# Patient Record
Sex: MALE | Race: WHITE | NOT HISPANIC OR LATINO | Employment: OTHER | URBAN - METROPOLITAN AREA
[De-identification: names, ages, dates, MRNs, and addresses within clinical notes are randomized per-mention and may not be internally consistent; named-entity substitution may affect disease eponyms.]

---

## 2019-07-14 ENCOUNTER — APPOINTMENT (EMERGENCY)
Dept: RADIOLOGY | Facility: HOSPITAL | Age: 69
End: 2019-07-14
Payer: MEDICARE

## 2019-07-14 ENCOUNTER — HOSPITAL ENCOUNTER (EMERGENCY)
Facility: HOSPITAL | Age: 69
End: 2019-07-15
Attending: EMERGENCY MEDICINE | Admitting: EMERGENCY MEDICINE
Payer: MEDICARE

## 2019-07-14 DIAGNOSIS — S22.39XA RIB FRACTURE: ICD-10-CM

## 2019-07-14 DIAGNOSIS — S12.9XXA CLOSED CERVICAL SPINE FRACTURE (HCC): Primary | ICD-10-CM

## 2019-07-14 LAB
ALBUMIN SERPL BCP-MCNC: 3.3 G/DL (ref 3.5–5)
ALP SERPL-CCNC: 77 U/L (ref 46–116)
ALT SERPL W P-5'-P-CCNC: 37 U/L (ref 12–78)
ANION GAP SERPL CALCULATED.3IONS-SCNC: 12 MMOL/L (ref 4–13)
APTT PPP: 31 SECONDS (ref 23–37)
AST SERPL W P-5'-P-CCNC: 37 U/L (ref 5–45)
BASOPHILS # BLD AUTO: 0.03 THOUSANDS/ΜL (ref 0–0.1)
BASOPHILS NFR BLD AUTO: 0 % (ref 0–1)
BILIRUB SERPL-MCNC: 0.9 MG/DL (ref 0.2–1)
BUN SERPL-MCNC: 16 MG/DL (ref 5–25)
CALCIUM SERPL-MCNC: 8.5 MG/DL (ref 8.3–10.1)
CHLORIDE SERPL-SCNC: 103 MMOL/L (ref 100–108)
CO2 SERPL-SCNC: 25 MMOL/L (ref 21–32)
CREAT SERPL-MCNC: 0.63 MG/DL (ref 0.6–1.3)
EOSINOPHIL # BLD AUTO: 0.01 THOUSAND/ΜL (ref 0–0.61)
EOSINOPHIL NFR BLD AUTO: 0 % (ref 0–6)
ERYTHROCYTE [DISTWIDTH] IN BLOOD BY AUTOMATED COUNT: 13.4 % (ref 11.6–15.1)
ETHANOL SERPL-MCNC: <3 MG/DL (ref 0–3)
GFR SERPL CREATININE-BSD FRML MDRD: 102 ML/MIN/1.73SQ M
GLUCOSE SERPL-MCNC: 73 MG/DL (ref 65–140)
HCT VFR BLD AUTO: 44.8 % (ref 36.5–49.3)
HGB BLD-MCNC: 15.2 G/DL (ref 12–17)
IMM GRANULOCYTES # BLD AUTO: 0.05 THOUSAND/UL (ref 0–0.2)
IMM GRANULOCYTES NFR BLD AUTO: 0 % (ref 0–2)
INR PPP: 0.88 (ref 0.86–1.16)
LYMPHOCYTES # BLD AUTO: 0.85 THOUSANDS/ΜL (ref 0.6–4.47)
LYMPHOCYTES NFR BLD AUTO: 7 % (ref 14–44)
MCH RBC QN AUTO: 34.5 PG (ref 26.8–34.3)
MCHC RBC AUTO-ENTMCNC: 33.9 G/DL (ref 31.4–37.4)
MCV RBC AUTO: 102 FL (ref 82–98)
MONOCYTES # BLD AUTO: 0.88 THOUSAND/ΜL (ref 0.17–1.22)
MONOCYTES NFR BLD AUTO: 7 % (ref 4–12)
NEUTROPHILS # BLD AUTO: 10.32 THOUSANDS/ΜL (ref 1.85–7.62)
NEUTS SEG NFR BLD AUTO: 86 % (ref 43–75)
NRBC BLD AUTO-RTO: 0 /100 WBCS
PLATELET # BLD AUTO: 272 THOUSANDS/UL (ref 149–390)
PMV BLD AUTO: 10 FL (ref 8.9–12.7)
POTASSIUM SERPL-SCNC: 3.8 MMOL/L (ref 3.5–5.3)
PROT SERPL-MCNC: 6.7 G/DL (ref 6.4–8.2)
PROTHROMBIN TIME: 9.3 SECONDS (ref 9.4–11.7)
RBC # BLD AUTO: 4.4 MILLION/UL (ref 3.88–5.62)
SODIUM SERPL-SCNC: 140 MMOL/L (ref 136–145)
WBC # BLD AUTO: 12.14 THOUSAND/UL (ref 4.31–10.16)

## 2019-07-14 PROCEDURE — 85025 COMPLETE CBC W/AUTO DIFF WBC: CPT | Performed by: EMERGENCY MEDICINE

## 2019-07-14 PROCEDURE — 71260 CT THORAX DX C+: CPT

## 2019-07-14 PROCEDURE — 85610 PROTHROMBIN TIME: CPT | Performed by: EMERGENCY MEDICINE

## 2019-07-14 PROCEDURE — 72125 CT NECK SPINE W/O DYE: CPT

## 2019-07-14 PROCEDURE — 80053 COMPREHEN METABOLIC PANEL: CPT | Performed by: EMERGENCY MEDICINE

## 2019-07-14 PROCEDURE — 70450 CT HEAD/BRAIN W/O DYE: CPT

## 2019-07-14 PROCEDURE — 74177 CT ABD & PELVIS W/CONTRAST: CPT

## 2019-07-14 PROCEDURE — 85730 THROMBOPLASTIN TIME PARTIAL: CPT | Performed by: EMERGENCY MEDICINE

## 2019-07-14 PROCEDURE — 80320 DRUG SCREEN QUANTALCOHOLS: CPT | Performed by: EMERGENCY MEDICINE

## 2019-07-14 PROCEDURE — 99285 EMERGENCY DEPT VISIT HI MDM: CPT

## 2019-07-14 PROCEDURE — 36415 COLL VENOUS BLD VENIPUNCTURE: CPT | Performed by: EMERGENCY MEDICINE

## 2019-07-14 RX ADMIN — IOHEXOL 100 ML: 350 INJECTION, SOLUTION INTRAVENOUS at 22:54

## 2019-07-15 ENCOUNTER — APPOINTMENT (INPATIENT)
Dept: RADIOLOGY | Facility: HOSPITAL | Age: 69
DRG: 552 | End: 2019-07-15
Payer: MEDICARE

## 2019-07-15 ENCOUNTER — HOSPITAL ENCOUNTER (INPATIENT)
Facility: HOSPITAL | Age: 69
LOS: 1 days | Discharge: HOME HEALTH | DRG: 552 | End: 2019-07-16
Attending: SURGERY | Admitting: SURGERY
Payer: MEDICARE

## 2019-07-15 VITALS
OXYGEN SATURATION: 96 % | TEMPERATURE: 98.1 F | DIASTOLIC BLOOD PRESSURE: 85 MMHG | HEART RATE: 92 BPM | RESPIRATION RATE: 18 BRPM | WEIGHT: 160 LBS | HEIGHT: 71 IN | BODY MASS INDEX: 22.4 KG/M2 | SYSTOLIC BLOOD PRESSURE: 165 MMHG

## 2019-07-15 DIAGNOSIS — S12.9XXA CLOSED FRACTURE OF SPINOUS PROCESS OF CERVICAL VERTEBRA, INITIAL ENCOUNTER (HCC): Primary | ICD-10-CM

## 2019-07-15 DIAGNOSIS — S22.32XA CLOSED FRACTURE OF ONE RIB OF LEFT SIDE, INITIAL ENCOUNTER: ICD-10-CM
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
## 2019-07-15 LAB
ANION GAP SERPL CALCULATED.3IONS-SCNC: 10 MMOL/L (ref 4–13)
BASOPHILS # BLD AUTO: 0.03 THOUSANDS/ΜL (ref 0–0.1)
BASOPHILS NFR BLD AUTO: 0 % (ref 0–1)
BUN SERPL-MCNC: 15 MG/DL (ref 5–25)
CALCIUM SERPL-MCNC: 8.6 MG/DL (ref 8.3–10.1)
CHLORIDE SERPL-SCNC: 104 MMOL/L (ref 100–108)
CO2 SERPL-SCNC: 23 MMOL/L (ref 21–32)
CREAT SERPL-MCNC: 0.44 MG/DL (ref 0.6–1.3)
EOSINOPHIL # BLD AUTO: 0.04 THOUSAND/ΜL (ref 0–0.61)
EOSINOPHIL NFR BLD AUTO: 0 % (ref 0–6)
ERYTHROCYTE [DISTWIDTH] IN BLOOD BY AUTOMATED COUNT: 13.5 % (ref 11.6–15.1)
GFR SERPL CREATININE-BSD FRML MDRD: 118 ML/MIN/1.73SQ M
GLUCOSE SERPL-MCNC: 71 MG/DL (ref 65–140)
HCT VFR BLD AUTO: 43.9 % (ref 36.5–49.3)
HGB BLD-MCNC: 15.2 G/DL (ref 12–17)
IMM GRANULOCYTES # BLD AUTO: 0.04 THOUSAND/UL (ref 0–0.2)
IMM GRANULOCYTES NFR BLD AUTO: 0 % (ref 0–2)
LYMPHOCYTES # BLD AUTO: 0.93 THOUSANDS/ΜL (ref 0.6–4.47)
LYMPHOCYTES NFR BLD AUTO: 8 % (ref 14–44)
MCH RBC QN AUTO: 34.9 PG (ref 26.8–34.3)
MCHC RBC AUTO-ENTMCNC: 34.6 G/DL (ref 31.4–37.4)
MCV RBC AUTO: 101 FL (ref 82–98)
MONOCYTES # BLD AUTO: 0.97 THOUSAND/ΜL (ref 0.17–1.22)
MONOCYTES NFR BLD AUTO: 9 % (ref 4–12)
NEUTROPHILS # BLD AUTO: 9.05 THOUSANDS/ΜL (ref 1.85–7.62)
NEUTS SEG NFR BLD AUTO: 83 % (ref 43–75)
NRBC BLD AUTO-RTO: 0 /100 WBCS
PLATELET # BLD AUTO: 258 THOUSANDS/UL (ref 149–390)
PMV BLD AUTO: 10 FL (ref 8.9–12.7)
POTASSIUM SERPL-SCNC: 3.8 MMOL/L (ref 3.5–5.3)
RBC # BLD AUTO: 4.35 MILLION/UL (ref 3.88–5.62)
SODIUM SERPL-SCNC: 137 MMOL/L (ref 136–145)
WBC # BLD AUTO: 11.06 THOUSAND/UL (ref 4.31–10.16)

## 2019-07-15 PROCEDURE — 36415 COLL VENOUS BLD VENIPUNCTURE: CPT | Performed by: SURGERY

## 2019-07-15 PROCEDURE — 85025 COMPLETE CBC W/AUTO DIFF WBC: CPT | Performed by: SURGERY

## 2019-07-15 PROCEDURE — NC001 PR NO CHARGE: Performed by: PHYSICIAN ASSISTANT

## 2019-07-15 PROCEDURE — 99222 1ST HOSP IP/OBS MODERATE 55: CPT | Performed by: NEUROLOGICAL SURGERY

## 2019-07-15 PROCEDURE — 94760 N-INVAS EAR/PLS OXIMETRY 1: CPT

## 2019-07-15 PROCEDURE — 99285 EMERGENCY DEPT VISIT HI MDM: CPT

## 2019-07-15 PROCEDURE — 72050 X-RAY EXAM NECK SPINE 4/5VWS: CPT

## 2019-07-15 PROCEDURE — 1124F ACP DISCUSS-NO DSCNMKR DOCD: CPT | Performed by: NEUROLOGICAL SURGERY

## 2019-07-15 PROCEDURE — 80048 BASIC METABOLIC PNL TOTAL CA: CPT | Performed by: SURGERY

## 2019-07-15 PROCEDURE — 99222 1ST HOSP IP/OBS MODERATE 55: CPT | Performed by: SURGERY

## 2019-07-15 RX ORDER — THIAMINE MONONITRATE (VIT B1) 100 MG
100 TABLET ORAL DAILY
Status: DISCONTINUED | OUTPATIENT
Start: 2019-07-15 | End: 2019-07-16 | Stop reason: HOSPADM

## 2019-07-15 RX ORDER — LIDOCAINE 50 MG/G
1 PATCH TOPICAL DAILY
Status: DISCONTINUED | OUTPATIENT
Start: 2019-07-15 | End: 2019-07-16 | Stop reason: HOSPADM

## 2019-07-15 RX ORDER — METHOCARBAMOL 500 MG/1
500 TABLET, FILM COATED ORAL EVERY 6 HOURS SCHEDULED
Status: DISCONTINUED | OUTPATIENT
Start: 2019-07-15 | End: 2019-07-16 | Stop reason: HOSPADM

## 2019-07-15 RX ORDER — POLYETHYLENE GLYCOL 3350 17 G/17G
17 POWDER, FOR SOLUTION ORAL DAILY
Status: DISCONTINUED | OUTPATIENT
Start: 2019-07-15 | End: 2019-07-16 | Stop reason: HOSPADM

## 2019-07-15 RX ORDER — DOCUSATE SODIUM 100 MG/1
100 CAPSULE, LIQUID FILLED ORAL 2 TIMES DAILY
Status: DISCONTINUED | OUTPATIENT
Start: 2019-07-15 | End: 2019-07-16 | Stop reason: HOSPADM

## 2019-07-15 RX ORDER — OXYCODONE HYDROCHLORIDE 5 MG/1
5 TABLET ORAL EVERY 4 HOURS PRN
Status: DISCONTINUED | OUTPATIENT
Start: 2019-07-15 | End: 2019-07-16 | Stop reason: HOSPADM

## 2019-07-15 RX ORDER — OXYCODONE HYDROCHLORIDE 10 MG/1
10 TABLET ORAL EVERY 4 HOURS PRN
Status: DISCONTINUED | OUTPATIENT
Start: 2019-07-15 | End: 2019-07-16 | Stop reason: HOSPADM

## 2019-07-15 RX ORDER — FOLIC ACID 1 MG/1
1 TABLET ORAL DAILY
Status: DISCONTINUED | OUTPATIENT
Start: 2019-07-15 | End: 2019-07-16 | Stop reason: HOSPADM

## 2019-07-15 RX ORDER — NICOTINE 21 MG/24HR
1 PATCH, TRANSDERMAL 24 HOURS TRANSDERMAL DAILY
Status: DISCONTINUED | OUTPATIENT
Start: 2019-07-15 | End: 2019-07-16 | Stop reason: HOSPADM

## 2019-07-15 RX ORDER — ACETAMINOPHEN 325 MG/1
975 TABLET ORAL EVERY 8 HOURS SCHEDULED
Status: DISCONTINUED | OUTPATIENT
Start: 2019-07-15 | End: 2019-07-16 | Stop reason: HOSPADM

## 2019-07-15 RX ORDER — HYDROMORPHONE HCL/PF 1 MG/ML
0.5 SYRINGE (ML) INJECTION
Status: DISCONTINUED | OUTPATIENT
Start: 2019-07-15 | End: 2019-07-16 | Stop reason: HOSPADM

## 2019-07-15 RX ORDER — BUTALBITAL, ACETAMINOPHEN AND CAFFEINE 50; 325; 40 MG/1; MG/1; MG/1
1 TABLET ORAL ONCE
Status: DISCONTINUED | OUTPATIENT
Start: 2019-07-15 | End: 2019-07-16 | Stop reason: HOSPADM

## 2019-07-15 RX ORDER — GABAPENTIN 100 MG/1
100 CAPSULE ORAL 3 TIMES DAILY
Status: DISCONTINUED | OUTPATIENT
Start: 2019-07-15 | End: 2019-07-16 | Stop reason: HOSPADM

## 2019-07-15 RX ADMIN — GABAPENTIN 100 MG: 100 CAPSULE ORAL at 18:41

## 2019-07-15 RX ADMIN — ACETAMINOPHEN 975 MG: 325 TABLET ORAL at 21:24

## 2019-07-15 RX ADMIN — DOCUSATE SODIUM 100 MG: 100 CAPSULE, LIQUID FILLED ORAL at 08:37

## 2019-07-15 RX ADMIN — ENOXAPARIN SODIUM 30 MG: 30 INJECTION SUBCUTANEOUS at 18:42

## 2019-07-15 RX ADMIN — GABAPENTIN 100 MG: 100 CAPSULE ORAL at 21:24

## 2019-07-15 RX ADMIN — METHOCARBAMOL 500 MG: 500 TABLET, FILM COATED ORAL at 06:11

## 2019-07-15 RX ADMIN — GABAPENTIN 100 MG: 100 CAPSULE ORAL at 08:37

## 2019-07-15 RX ADMIN — ACETAMINOPHEN 975 MG: 325 TABLET ORAL at 06:11

## 2019-07-15 RX ADMIN — LIDOCAINE 1 PATCH: 50 PATCH CUTANEOUS at 08:38

## 2019-07-15 RX ADMIN — OXYCODONE HYDROCHLORIDE 10 MG: 10 TABLET ORAL at 08:37

## 2019-07-15 RX ADMIN — Medication 1 TABLET: at 08:37

## 2019-07-15 RX ADMIN — ACETAMINOPHEN 975 MG: 325 TABLET ORAL at 13:40

## 2019-07-15 RX ADMIN — OXYCODONE HYDROCHLORIDE 10 MG: 10 TABLET ORAL at 03:39

## 2019-07-15 RX ADMIN — OXYCODONE HYDROCHLORIDE 10 MG: 10 TABLET ORAL at 19:24

## 2019-07-15 RX ADMIN — THIAMINE HCL TAB 100 MG 100 MG: 100 TAB at 08:52

## 2019-07-15 RX ADMIN — FOLIC ACID 1 MG: 1 TABLET ORAL at 08:37

## 2019-07-15 RX ADMIN — METHOCARBAMOL 500 MG: 500 TABLET, FILM COATED ORAL at 11:01

## 2019-07-15 RX ADMIN — DOCUSATE SODIUM 100 MG: 100 CAPSULE, LIQUID FILLED ORAL at 18:41

## 2019-07-15 RX ADMIN — METHOCARBAMOL 500 MG: 500 TABLET, FILM COATED ORAL at 18:41

## 2019-07-15 RX ADMIN — HYDROMORPHONE HYDROCHLORIDE 0.5 MG: 1 INJECTION, SOLUTION INTRAMUSCULAR; INTRAVENOUS; SUBCUTANEOUS at 21:24

## 2019-07-15 NOTE — EMTALA/ACUTE CARE TRANSFER
[address] [address]  Dept: [phone]      EMTALA TRANSFER CONSENT    NAME Hughie Canavan                                         1950                              MRN [id number]    I have been informed of my rights regarding examination, treatment, and transfer   by Dr Rashmi Parikh DO    Benefits: Specialized equipment and/or services available at the receiving facility (Include comment)________________________(Trauma eval)    Risks: Potential for delay in receiving treatment, Potential deterioration of medical condition, Increased discomfort during transfer, Possible worsening of condition or death during transfer      Consent for Transfer:  I acknowledge that my medical condition has been evaluated and explained to me by the emergency department physician or other qualified medical person and/or my attending physician, who has recommended that I be transferred to the service of  Accepting Physician: Dr Kalyani Ruffin at [address] [place] [address] Name, [address] : [address] [facility], [place]  The above potential benefits of such transfer, the potential risks associated with such transfer, and the probable risks of not being transferred have been explained to me, and I fully understand them  The doctor has explained that, in my case, the benefits of transfer outweigh the risks  I agree to be transferred  I authorize the performance of emergency medical procedures and treatments upon me in both transit and upon arrival at the receiving facility  Additionally, I authorize the release of any and all medical records to the receiving facility and request they be transported with me, if possible  I understand that the safest mode of transportation during a medical emergency is an ambulance and that the Hospital advocates the use of this mode of transport   Risks of traveling to the receiving facility by car, including absence of medical control, life sustaining equipment, such as oxygen, and medical personnel has been explained to me and I fully understand them  (YARI CORRECT BOX BELOW)  [  ]  I consent to the stated transfer and to be transported by ambulance/helicopter  [  ]  I consent to the stated transfer, but refuse transportation by ambulance and accept full responsibility for my transportation by car  I understand the risks of non-ambulance transfers and I exonerate the Hospital and its staff from any deterioration in my condition that results from this refusal     X___________________________________________    DATE  19  TIME________  Signature of patient or legally responsible individual signing on patient behalf           RELATIONSHIP TO PATIENT_________________________          Provider Certification    NAME Krystyna Irene                                         1950                              MRN [id number]    A medical screening exam was performed on the above named patient  Based on the examination:    Condition Necessitating Transfer The primary encounter diagnosis was Closed cervical spine fracture ([facility] [address] )  A diagnosis of Rib fracture was also pertinent to this visit  Patient Condition:  An emergency transfer is being made prior to stabilization due to the need for definitive care and the benefit of transfer outweighs the risk    Reason for Transfer: Level of Care needed not available at this facility    Transfer Requirements: [address] [place], [place]   · Space available and qualified personnel available for treatment as acknowledged by    · Agreed to accept transfer and to provide appropriate medical treatment as acknowledged by       Dr Jewelene Gosselin  · Appropriate medical records of the examination and treatment of the patient are provided at the time of transfer   [address] [facility] _______  · Transfer will be performed by qualified personnel from    and appropriate transfer equipment as required, including the use of necessary and appropriate life support measures  Provider Certification: I have examined the patient and explained the following risks and benefits of being transferred/refusing transfer to the patient/family:  General risk, such as traffic hazards, adverse weather conditions, rough terrain or turbulence, possible failure of equipment (including vehicle or aircraft), or consequences of actions of persons outside the control of the transport personnel, Risk of worsening condition      Based on these reasonable risks and benefits to the patient and/or the unborn child(huber), and based upon the information available at the time of the patients examination, I certify that the medical benefits reasonably to be expected from the provision of appropriate medical treatments at another medical facility outweigh the increasing risks, if any, to the individuals medical condition, and in the case of labor to the unborn child, from effecting the transfer      X____________________________________________ DATE 07/14/19        TIME_______      ORIGINAL - SEND TO MEDICAL RECORDS   COPY - SEND WITH PATIENT DURING TRANSFER
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
## 2019-07-15 NOTE — ASSESSMENT & PLAN NOTE
· 7/14/19 - CT cervical wo: 1) Mildly displaced fracture of the C6 spinous process  · Aspen collar in place during examination  · No midline tenderness  · Ordered flexion/extension films  Collar should be maintained until x-rays completed  · DVT ppx: SCDs  · No anticipated neurosurgical intervention  Pending x-rays

## 2019-07-15 NOTE — SOCIAL WORK
CM met with the patient at bedside to review the CM role and discuss possible dc needs  At time of interview pt is AAOx4 lives in a 3rd floor apartment with approximately 27 steps to apartment and  [address] in [place]  Pt lives with son, and daughter in law  Pt was IPTA ADL's does not drive but takes the bus to appointments and is retired  Pt states he is a   Pt has no DME and ambulates independantly  Pt denies any history of drug abuse but states he drinks a 6 pack of beer a day and states he does abuse alcohol but declines HOST at this time  Pt states he was hospitalized to [facility] in [place] in the 70's for depression and a suicide attempt  Pt denies any history of  VNA but was at [facility] in the past for SNF/ Rehab about 2 years ago  Pt has POA/LW  CM made 2nd request for a copy  Preferred Pharmacy: [facility] on [address]  Contact: Corinna Garcia (sister and [place]) [phone]  PCP: Dr Trivedi    CM reviewed d/c planning process including the following: identifying help at home, patient preference for d/c planning needs, Discharge Lounge, [facility] Meds to Bed program, availability of treatment team to discuss questions or concerns patient and/or family may have regarding understanding medications and recognizing signs and symptoms once discharged  CM also encouraged patient to follow up with all recommended appointments after discharge  Patient advised of importance for patient and family to participate in managing patients medical well being

## 2019-07-15 NOTE — CONSULTS
Acute Pain Service Brief Note    Krystyna Irene MRN: [id number]  Unit/Bed#: [facility] 627-01 Encounter: [id number]      Chart reviewed  This patient does not meet rib fracture protocol  Epidural not indicated  Vitals signs reviewed and stable  If there are any questions please call Acute Pain Service at     Thank you for the consult    Sign off    Vitals:    07/15/19 0730   BP: 152/82   Pulse: 76   Resp: 16   Temp:    SpO2: 94%

## 2019-07-15 NOTE — PLAN OF CARE
Problem: Potential for Falls  Goal: Patient will remain free of falls  Description  INTERVENTIONS:  - Assess patient frequently for physical needs  -  Identify cognitive and physical deficits and behaviors that affect risk of falls    -  [place] fall precautions as indicated by assessment   - Educate patient/family on patient safety including physical limitations  - Instruct patient to call for assistance with activity based on assessment  - Modify environment to reduce risk of injury  - Consider OT/PT consult to assist with strengthening/mobility  Outcome: Progressing     Problem: PAIN - ADULT  Goal: Verbalizes/displays adequate comfort level or baseline comfort level  Description  Interventions:  - Encourage patient to monitor pain and request assistance  - Assess pain using appropriate pain scale  - Administer analgesics based on type and severity of pain and evaluate response  - Implement non-pharmacological measures as appropriate and evaluate response  - Consider cultural and social influences on pain and pain management  - Notify physician/advanced practitioner if interventions unsuccessful or patient reports new pain  Outcome: Progressing     Problem: INFECTION - ADULT  Goal: Absence or prevention of progression during hospitalization  Description  INTERVENTIONS:  - Assess and monitor for signs and symptoms of infection  - Monitor lab/diagnostic results  - Monitor all insertion sites, i e  indwelling lines, tubes, and drains  - Monitor endotracheal (as able) and nasal secretions for changes in amount and color  - [place] appropriate cooling/warming therapies per order  - Administer medications as ordered  - Instruct and encourage patient and family to use good hand hygiene technique  - Identify and instruct in appropriate isolation precautions for identified infection/condition  Outcome: Progressing     Problem: SAFETY ADULT  Goal: Maintain or return to baseline ADL function  Description  INTERVENTIONS:  -  Assess patient's ability to carry out ADLs; assess patient's baseline for ADL function and identify physical deficits which impact ability to perform ADLs (bathing, care of mouth/teeth, toileting, grooming, dressing, etc )  - Assess/evaluate cause of self-care deficits   - Assess range of motion  - Assess patient's mobility; develop plan if impaired  - Assess patient's need for assistive devices and provide as appropriate  - Encourage maximum independence but intervene and supervise when necessary  ¯ Involve family in performance of ADLs  ¯ Assess for home care needs following discharge   ¯ Request OT consult to assist with ADL evaluation and planning for discharge  ¯ Provide patient education as appropriate  Outcome: Progressing  Goal: Maintain or return mobility status to optimal level  Description  INTERVENTIONS:  - Assess patient's baseline mobility status (ambulation, transfers, stairs, etc )    - Identify cognitive and physical deficits and behaviors that affect mobility  - Identify mobility aids required to assist with transfers and/or ambulation (gait belt, sit-to-stand, lift, walker, cane, etc )  - [place] fall precautions as indicated by assessment  - Record patient progress and toleration of activity level on Mobility SBAR; progress patient to next Phase/Stage  - Instruct patient to call for assistance with activity based on assessment  - Request Rehabilitation consult to assist with strengthening/weightbearing, etc   Outcome: Progressing     Problem: DISCHARGE PLANNING  Goal: Discharge to home or other facility with appropriate resources  Description  INTERVENTIONS:  - Identify barriers to discharge w/patient and caregiver  - Arrange for needed discharge resources and transportation as appropriate  - Identify discharge learning needs (meds, wound care, etc )  - Arrange for interpretive services to assist at discharge as needed  - Refer to Case Management Department for coordinating discharge planning if the patient needs post-hospital services based on physician/advanced practitioner order or complex needs related to functional status, cognitive ability, or social support system  Outcome: Progressing     Problem: Knowledge Deficit  Goal: Patient/family/caregiver demonstrates understanding of disease process, treatment plan, medications, and discharge instructions  Description  Complete learning assessment and assess knowledge base    Interventions:  - Provide teaching at level of understanding  - Provide teaching via preferred learning methods  Outcome: Progressing

## 2019-07-15 NOTE — ASSESSMENT & PLAN NOTE
-Aspen collar in place and patient is obtaining flexion-extension x-rays as ordered by Neurosurgery  -cervical spine precautions and cervical collar are to remain in place until the x-rays have resulted  -this is likely chronic as patient has no pain or discomfort in the neck

## 2019-07-15 NOTE — ASSESSMENT & PLAN NOTE
-patient is having pain in the left chest wall associated with this rib fracture  -rib fracture protocol  -pulmonary toilet/incentive spirometry-patient has been compliant with this  -pain is currently adequately controlled and he is requiring no supplemental oxygen  -PT/OT evaluation pending

## 2019-07-15 NOTE — PROGRESS NOTES
Progress Note - Cristina Gomez 1950, 80 y o  male MRN: [id number]    Unit/Bed#: [facility] 627-01 Encounter: [id number]    Primary Care Provider: No primary care provider on file  Date and time admitted to hospital: 7/15/2019  2:08 AM        Fall  Assessment & Plan  -PT/OT evaluations are pending    Closed fracture of spinous process of cervical vertebra [facility])  Assessment & Plan  -Aspen collar in place and patient is obtaining flexion-extension x-rays as ordered by Neurosurgery  -cervical spine precautions and cervical collar are to remain in place until the x-rays have resulted  -this is likely chronic as patient has no pain or discomfort in the neck    * Fracture of one rib of left side  Assessment & Plan  -patient is having pain in the left chest wall associated with this rib fracture  -rib fracture protocol  -pulmonary toilet/incentive spirometry-patient has been compliant with this  -pain is currently adequately controlled and he is requiring no supplemental oxygen  -PT/OT evaluation pending            Bedside nurse rounds completed with nurse  Prophylaxis: Sequential compression device (Venodyne)  and Enoxaparin (Lovenox)    Disposition: PT/OT pending, continue med-surg    Code status:  Level 1 - Full Code    Consultants: Neurosurgery    Is the patient 65 years or older?: YES:    1  Before the illness or injury that brought you to the Emergency, did you need someone to help you on a regular basis? 0=No   2  Since the illness or injury that brought you to the Emergency, have you needed more help than usual to take care of yourself? 0=No   3  Have you been hospitalized for one or more nights during the past 6 months (excluding a stay in the Emergency Department)? 0=No   4  In general, do you see well? 0=Yes   5  In general, do you have serious problems with your memory? 0=No   6  Do you take more than three different medications everyday?  0=No   TOTAL   1     Did you order a geriatric consult if the score was 2 or greater?: no    SUBJECTIVE:     Transfer from: N/A  Outside Films Received: not applicable  Tertiary Exam Due on: 7/15    Mechanism of Injury:Fall    Details related to Injury: +LOC:  no    Chief Complaint:  Left chest wall pain    HPI/Last 24 hour events:  Patient complains of pain in the area of his rib fracture  He denies shortness of breath lightheadedness or dizziness  He denies any pain in his neck wonders if he can have his collar removed  He does admit to drinking 2 pt of alcohol a day  He states he has sometimes had some shakes when he gets long cash in is unable to afford his alcohol  He is a smoker as well but is able to go multiple days without smoking and does not request a nicotine patch      Active medications:           Current Facility-Administered Medications:     acetaminophen (TYLENOL) tablet 975 mg, 975 mg, Oral, Q8H MATTHEW, 975 mg at 07/15/19 1340    butalbital-acetaminophen-caffeine (FIORICET,ESGIC) -40 mg per tablet 1 tablet, 1 tablet, Oral, Once    docusate sodium (COLACE) capsule 100 mg, 100 mg, Oral, BID, 100 mg at 07/15/19 0837    enoxaparin (LOVENOX) subcutaneous injection 30 mg, 30 mg, Subcutaneous, [address]    folic acid (FOLVITE) tablet 1 mg, 1 mg, Oral, Daily, 1 mg at 07/15/19 0837    gabapentin (NEURONTIN) capsule 100 mg, 100 mg, Oral, TID, 100 mg at 07/15/19 0837    HYDROmorphone (DILAUDID) injection 0 5 mg, 0 5 mg, Intravenous, Q3H PRN    lidocaine (LIDODERM) 5 % patch 1 patch, 1 patch, Topical, Daily, 1 patch at 07/15/19 0838    methocarbamol (ROBAXIN) tablet 500 mg, 500 mg, Oral, Q6H MATTHEW, 500 mg at 07/15/19 1101    multivitamin-minerals (CENTRUM) tablet 1 tablet, 1 tablet, Oral, Daily, 1 tablet at 07/15/19 0837    nicotine (NICODERM CQ) 14 mg/24hr TD 24 hr patch 1 patch, 1 patch, Transdermal, Daily    oxyCODONE (ROXICODONE) immediate release tablet 10 mg, 10 mg, Oral, Q4H PRN, 10 mg at 07/15/19 0856    oxyCODONE (ROXICODONE) IR tablet 5 mg, 5 mg, Oral, Q4H PRN    polyethylene glycol (MIRALAX) packet 17 g, 17 g, Oral, Daily    thiamine (VITAMIN B1) tablet 100 mg, 100 mg, Oral, Daily, 100 mg at 07/15/19 0852      OBJECTIVE:     Vitals:   Vitals:    07/15/19 1300   BP: 140/80   Pulse: (!) 106   Resp:    Temp:    SpO2:        Physical Exam:   GENERAL APPEARANCE:  No acute distress  NEURO:  GCS 15, nonfocal exam  HEENT:  Normocephalic, atraumatic  CV:  Regular rate and rhythm, no murmurs gallops or rubs  LUNGS:  Clear to auscultation bilaterally  GI:  Soft, nontender, nondistended  :  Voiding  MSK:  Moving all extremities equally and with full strength  SKIN:  Pink, warm, dry      I/O:   I/O       07/13 0701 - 07/14 0700 07/14 0701 - 07/15 0700 07/15 0701 - 07/16 0700    P  O    1180    Total Intake   1180    Urine   300    Total Output   300    Net   +880                 Invasive Devices: Invasive Devices     Peripheral Intravenous Line            Peripheral IV 07/14/19 Right Antecubital less than 1 day                  Imaging:   No results found      Labs:   CBC:   Lab Results   Component Value Date    WBC 11 06 (H) 07/15/2019    HGB 15 2 07/15/2019    HCT 43 9 07/15/2019     (H) 07/15/2019     07/15/2019    MCH 34 9 (H) 07/15/2019    MCHC 34 6 07/15/2019    RDW 13 5 07/15/2019    MPV 10 0 07/15/2019    NRBC 0 07/15/2019     CMP:   Lab Results   Component Value Date     07/15/2019    CO2 23 07/15/2019    BUN 15 07/15/2019    CREATININE 0 44 (L) 07/15/2019    CALCIUM 8 6 07/15/2019    AST 37 07/14/2019    ALT 37 07/14/2019    ALKPHOS 77 07/14/2019    EGFR 118 07/15/2019

## 2019-07-15 NOTE — ASSESSMENT & PLAN NOTE
· 7/14/19 CT recon thoracolumbar: nondisplaced fracture of left posterior 11th rib  · Pain control  · Pulmonary toilet/IS  · Management per primary team, Trauma

## 2019-07-15 NOTE — RESPIRATORY THERAPY NOTE
RT Protocol Note  Cristina Gomez 80 y o  male MRN: [id number]  Unit/Bed#: ED 02 Encounter: [id number]    Assessment    Principal Problem:    Fracture of one rib of left side  Active Problems:    Closed fracture of spinous process of cervical vertebra [facility])      Home Pulmonary Medications:  NA       History reviewed  No pertinent past medical history  Social History     Socioeconomic History    Marital status: /Civil Union     Spouse name: None    Number of children: None    Years of education: None    Highest education level: None   Occupational History    None   Social Needs    Financial resource strain: None    Food insecurity:     Worry: None     Inability: None    Transportation needs:     Medical: None     Non-medical: None   Tobacco Use    Smoking status: Current Every Day Smoker     Packs/day: 1 00    Smokeless tobacco: Never Used   Substance and Sexual Activity    Alcohol use: Yes     Comment: daily    Drug use: No    Sexual activity: None   Lifestyle    Physical activity:     Days per week: None     Minutes per session: None    Stress: None   Relationships    Social connections:     Talks on phone: None     Gets together: None     Attends Shinto service: None     Active member of club or organization: None     Attends meetings of clubs or organizations: None     Relationship status: None    Intimate partner violence:     Fear of current or ex partner: None     Emotionally abused: None     Physically abused: None     Forced sexual activity: None   Other Topics Concern    None   Social History Narrative    None       Subjective         Objective    Physical Exam:   Assessment Type: (P) Assess only  General Appearance: (P) Awake  Respiratory Pattern: (P) Normal  Chest Assessment: (P) Chest expansion symmetrical  Bilateral Breath Sounds: (P) Clear    Vitals:  Blood pressure 167/91, pulse 86, temperature 97 8 °F (36 6 °C), temperature source Oral, resp  rate 18, SpO2 (P) 96 %  Imaging and other studies: I have personally reviewed pertinent reports  Plan       Airway Clearance Plan: (P) Incentive Spirometer     Resp Comments: (P) Pt not in any respiratory distress  Breathe sounds are clear  Pt has pain upon taking deep breathes  Instructed pt on splinting for pain control  Instructed pt on incentive spirometer  Pt was able to get 1250 on IS  Will continue to work with pt

## 2019-07-15 NOTE — H&P
H&P Exam - Trauma   Rena Epps 80 y o  male MRN: [id number]  Unit/Bed#: ED 02 Encounter: [id number]    Assessment/Plan   Trauma Alert: Evaluation  Model of Arrival: Transfer [facility]  Trauma Team: Attending Alfonso Heard and Residents Sal White  Consultants: Neurosurgery: On Call  Returned call: No no called    Trauma Active Problems:   C6 spinous process fracture  Posterior [place]arer through fracture    Trauma Plan:   Admit to trauma, Med surge  Rib fracture protocol next 9 aspirin collar  Cervical precautions  Consult neuro surgery  Hold DVT prophylaxis pending neurosurgical evaluation  Repeat chest x-ray later today 715    Chief Complaint:  My ribs hurt    History of Present Illness   HPI:  Rena Epps is a 80 y o  male who presents as a transfer from [address] [place] [address] where he presented after having more pain with ambulation and difficulty breathing  Patient is a known alcoholic and smoker  Patient states he has fallen multiple times in the last few days and has struck head however does not have any loss of consciousness  Patient's main complaint is inability to take deep breaths and cough to clear phlegm  Did not realize he had any neck pain however upon scan at [facility] showed a C6 spinous process fracture     Mechanism:Fall    Review of Systems   Constitutional: Negative  HENT: Negative  Eyes: Negative  Respiratory: Negative  Cardiovascular: Negative  Gastrointestinal: Negative  Endocrine: Negative  Genitourinary: Negative  Musculoskeletal: Negative  Skin: Negative  Allergic/Immunologic: Negative  Neurological: Negative  Hematological: Negative  Psychiatric/Behavioral: Negative  Historical Information   History is unobtainable from the patient due to  Efforts to obtain history included the following sources: obtained from other records    History reviewed  No pertinent past medical history    Past Surgical History:   Procedure Laterality Date    TONSILLECTOMY Social History   Social History     Substance and Sexual Activity   Alcohol Use Yes    Comment: daily     Social History     Substance and Sexual Activity   Drug Use No     Social History     Tobacco Use   Smoking Status Current Every Day Smoker    Packs/day: 1 00   Smokeless Tobacco Never Used       There is no immunization history on file for this patient  Last Tetanus: unknown  Family History: Non-contributory  Unable to obtain/limited by n/a      Meds/Allergies   all current active meds have been reviewed    No Known Allergies      PHYSICAL EXAM    PE limited by: n/a    Objective   Vitals:   First set: Temperature: 97 8 °F (36 6 °C) (07/15/19 0214)  Pulse: 90 (07/15/19 0214)  Respirations: 20 (07/15/19 0214)  Blood Pressure: 168/88 (07/15/19 0214)    Primary Survey:   (A) Airway:  Patent  (B) Breathing:  Breath sounds bilaterally  (C) Circulation: Pulses:   normal and radial  2/4  (D) Disabliity:  GCS Total:  15  (E) Expose:  Completed    Secondary Survey: (Click on Physical Exam tab above)  Physical Exam   Constitutional: He is oriented to person, place, and time  He appears well-developed and well-nourished  HENT:   Head: Normocephalic and atraumatic  Eyes: Pupils are equal, round, and reactive to light  EOM are normal    Neck:   In cervical collar, denies midline tenderness   Cardiovascular: Normal rate and regular rhythm  Pulmonary/Chest: No respiratory distress  He exhibits tenderness (Left posterior rib cage tenderness)  Patient with shallow breathing   Abdominal: Soft  He exhibits no distension  There is no tenderness  Genitourinary:   Genitourinary Comments: Deferred   Musculoskeletal: Normal range of motion  He exhibits no tenderness  Neurological: He is alert and oriented to person, place, and time  Skin: Skin is warm  Psychiatric: He has a normal mood and affect   His behavior is normal        Invasive Devices     Peripheral Intravenous Line            Peripheral IV 07/14/19 Right Antecubital less than 1 day                Lab Results:   Results: I have personally reviewed pertinent reports   , BMP/CMP:   Lab Results   Component Value Date    SODIUM 140 07/14/2019    K 3 8 07/14/2019     07/14/2019    CO2 25 07/14/2019    BUN 16 07/14/2019    CREATININE 0 63 07/14/2019    CALCIUM 8 5 07/14/2019    AST 37 07/14/2019    ALT 37 07/14/2019    ALKPHOS 77 07/14/2019    EGFR 102 07/14/2019    and CBC:   Lab Results   Component Value Date    WBC 12 14 (H) 07/14/2019    HGB 15 2 07/14/2019    HCT 44 8 07/14/2019     (H) 07/14/2019     07/14/2019    MCH 34 5 (H) 07/14/2019    MCHC 33 9 07/14/2019    RDW 13 4 07/14/2019    MPV 10 0 07/14/2019    NRBC 0 07/14/2019     Imaging/EKG Studies: Results: I have personally reviewed pertinent reports    , CT Scan Head: Negative, CT Scan C-Spine: C6 spinous process fracture, CT Chest: Left posterior rib fracture, CT Scan Abdomen/Pelvis: Negative  Other Studies:     Code Status: Level 1 - Full Code  Advance Directive and Living Will:      Power of :    POLST:

## 2019-07-15 NOTE — ED PROVIDER NOTES
History  Chief Complaint   Patient presents with    Fall     patient fell yesterday thinks hit head on bumper of truck, possible for LOC, did not seek medical treatment at the time, does drink 2 pts of rum everyday,      Pt presents with c/o left sided back pain that began after multiple falls yestyerday and today  Pain is now worse with ambulation  Pt is an alcoholic and drinks 2pints of rum daily  Last drink was yesterday  Prior to Admission Medications   Prescriptions Last Dose Informant Patient Reported? Taking?   aspirin 500 MG buffered tablet   Yes Yes   Sig: Take 500 mg by mouth daily      Facility-Administered Medications: None       History reviewed  No pertinent past medical history  Past Surgical History:   Procedure Laterality Date    TONSILLECTOMY         History reviewed  No pertinent family history  I have reviewed and agree with the history as documented  Social History     Tobacco Use    Smoking status: Current Every Day Smoker     Packs/day: 1 00    Smokeless tobacco: Never Used   Substance Use Topics    Alcohol use: Yes     Comment: daily    Drug use: No        Review of Systems   Constitutional: Negative for chills and fever  Respiratory: Negative for cough, shortness of breath and wheezing  Cardiovascular: Negative for chest pain and palpitations  Gastrointestinal: Negative for abdominal pain, constipation, diarrhea, nausea and vomiting  Genitourinary: Negative for dysuria, flank pain, hematuria and urgency  Musculoskeletal: Positive for back pain and gait problem  Skin: Negative for color change and rash  All other systems reviewed and are negative  Physical Exam  Physical Exam   Constitutional: He is oriented to person, place, and time  He appears well-developed and well-nourished  HENT:   Head: Normocephalic and atraumatic  Eyes: Pupils are equal, round, and reactive to light   EOM are normal    Cardiovascular: Normal rate, regular rhythm and normal heart sounds  Pulmonary/Chest: Effort normal and breath sounds normal    Abdominal: Soft  Bowel sounds are normal  He exhibits no distension and no mass  There is no tenderness  There is no rebound and no guarding  Musculoskeletal: He exhibits tenderness  Left sided rib tenderness    Neurological: He is alert and oriented to person, place, and time  Skin: Skin is warm and dry  Psychiatric: He has a normal mood and affect  His behavior is normal  Judgment and thought content normal    Nursing note and vitals reviewed        Vital Signs  ED Triage Vitals [07/14/19 2110]   Temperature Pulse Respirations Blood Pressure SpO2   98 1 °F (36 7 °C) 87 16 145/81 95 %      Temp Source Heart Rate Source Patient Position - Orthostatic VS BP Location FiO2 (%)   Tympanic Monitor Lying Right arm --      Pain Score       1           Vitals:    07/14/19 2145 07/14/19 2200 07/14/19 2330 07/15/19 0053   BP: 160/90   165/85   Pulse: 96 90 90 92   Patient Position - Orthostatic VS:             Visual Acuity  Visual Acuity      Most Recent Value   L Pupil Size (mm)  3   R Pupil Size (mm)  3          ED Medications  Medications   iohexol (OMNIPAQUE) 350 MG/ML injection (MULTI-DOSE) 100 mL (100 mL Intravenous Given 7/14/19 2254)       Diagnostic Studies  Results Reviewed     Procedure Component Value Units Date/Time    Comprehensive metabolic panel [[id number]]  (Abnormal) Collected:  07/14/19 2142    Lab Status:  Final result Specimen:  Blood from Arm, Right Updated:  07/14/19 2205     Sodium 140 mmol/L      Potassium 3 8 mmol/L      Chloride 103 mmol/L      CO2 25 mmol/L      ANION GAP 12 mmol/L      BUN 16 mg/dL      Creatinine 0 63 mg/dL      Glucose 73 mg/dL      Calcium 8 5 mg/dL      AST 37 U/L      ALT 37 U/L      Alkaline Phosphatase 77 U/L      Total Protein 6 7 g/dL      Albumin 3 3 g/dL      Total Bilirubin 0 90 mg/dL      eGFR 102 ml/min/1 73sq m     Narrative:       [place] guidelines for Chronic Kidney Disease (CKD):     Stage 1 with normal or high GFR (GFR > 90 mL/min/1 73 square meters)    Stage 2 Mild CKD (GFR = 60-89 mL/min/1 73 square meters)    Stage 3A Moderate CKD (GFR = 45-59 mL/min/1 73 square meters)    Stage 3B Moderate CKD (GFR = 30-44 mL/min/1 73 square meters)    Stage 4 Severe CKD (GFR = 15-29 mL/min/1 73 square meters)    Stage 5 End Stage CKD (GFR <15 mL/min/1 73 square meters)  Note: GFR calculation is accurate only with a steady state creatinine    Protime-INR [[id number]]  (Abnormal) Collected:  07/14/19 2142    Lab Status:  Final result Specimen:  Blood from Arm, Right Updated:  07/14/19 2203     Protime 9 3 seconds      INR 0 88    APTT [[id number]]  (Normal) Collected:  07/14/19 2142    Lab Status:  Final result Specimen:  Blood from Arm, Right Updated:  07/14/19 2203     PTT 31 seconds     Ethanol [[id number]]  (Normal) Collected:  07/14/19 2142    Lab Status:  Final result Specimen:  Blood from Arm, Right Updated:  07/14/19 2201     Ethanol Lvl <3 mg/dL     CBC and differential [[id number]]  (Abnormal) Collected:  07/14/19 2142    Lab Status:  Final result Specimen:  Blood from Arm, Right Updated:  07/14/19 2148     WBC 12 14 Thousand/uL      RBC 4 40 Million/uL      Hemoglobin 15 2 g/dL      Hematocrit 44 8 %       fL      MCH 34 5 pg      MCHC 33 9 g/dL      RDW 13 4 %      MPV 10 0 fL      Platelets 760 Thousands/uL      nRBC 0 /100 WBCs      Neutrophils Relative 86 %      Immat GRANS % 0 %      Lymphocytes Relative 7 %      Monocytes Relative 7 %      Eosinophils Relative 0 %      Basophils Relative 0 %      Neutrophils Absolute 10 32 Thousands/µL      Immature Grans Absolute 0 05 Thousand/uL      Lymphocytes Absolute 0 85 Thousands/µL      Monocytes Absolute 0 88 Thousand/µL      Eosinophils Absolute 0 01 Thousand/µL      Basophils Absolute 0 03 Thousands/µL                  CT recon only thoracolumbar   Final Result by Chelsea Mayes DO (07/14 7384) Nondisplaced fracture of the left posterior 11th rib  Workstation performed: [id number]         CT chest abdomen pelvis w contrast   Final Result by Elisabeth Balderas DO (07/14 2322)      No acute findings  Small hypodense lesions in the liver for which nonemergent liver ultrasound is recommended for further evaluation  Workstation performed: [id number]         CT cervical spine without contrast   Final Result by Elisabeth Balderas DO (07/14 2331)      Mildly displaced fracture of the C6 spinous process  I personally discussed this study with Pal Smith on 7/14/2019 at 11:14 PM                             Workstation performed: [id number]         CT head without contrast   Final Result by Elisabeth Balderas DO (07/14 2310)      No acute intracranial abnormality  Sinus disease                  Workstation performed: [id number]                    Procedures  Procedures       ED Course                               MDM  Number of Diagnoses or Management Options  Closed cervical spine fracture [facility]):   Rib fracture:   Diagnosis management comments: D/w Dr Dinorah Shearer of [facility] [place] [facility]  Will transfer        Amount and/or Complexity of Data Reviewed  Clinical lab tests: ordered and reviewed  Tests in the radiology section of CPT®: ordered and reviewed    Risk of Complications, Morbidity, and/or Mortality  Presenting problems: moderate  Diagnostic procedures: moderate  Management options: moderate    Patient Progress  Patient progress: stable      Disposition  Final diagnoses:   Closed cervical spine fracture ([address] )   Rib fracture     Time reflects when diagnosis was documented in both MDM as applicable and the Disposition within this note     Time User Action Codes Description Comment    7/14/2019 11:44 PM Jose Chan Add [S12  9XXA] Closed cervical spine fracture ([address] )     7/14/2019 11:44 PM Jose Fitzpatrick [S22 39XA] Rib fracture       ED Disposition     ED Disposition Condition Date/Time Comment    Transfer to Another Facility-In Network  Sun Jul 14, 2019 11:42 PM Singh Perez should be transferred out to           MD Documentation      Most Recent Value   Patient Condition  An emergency transfer is being made prior to stabilization due to the need for definitive care and the benefit of transfer outweighs the risk   Reason for Transfer  Level of Care needed not available at this facility   Benefits of Transfer  Specialized equipment and/or services available at the receiving facility (Include comment)________________________ Patrice aparicio]   Risks of Transfer  Potential for delay in receiving treatment, Potential deterioration of medical condition, Increased discomfort during transfer, Possible worsening of condition or death during transfer   Accepting Physician  Dr Vijay Stoner Name, Michael [place]   Sending MD Dr Martin Salvage   Provider Certification  General risk, such as traffic hazards, adverse weather conditions, rough terrain or turbulence, possible failure of equipment (including vehicle or aircraft), or consequences of actions of persons outside the control of the transport personnel, Risk of worsening condition      RN Documentation      [facility] Name, Paula Wright   Bed Assignment  ER   Report Given to  Savana Dixon RN   Medications Reviewed with Next Provider of Service  Yes   Transport Mode  Ambulance   Level of Care  Basic life support   Copies of Medical Records Sent  Transfer form   Patient Belongings Disposition  Sent with patient   Transfer Date  07/15/19   Transfer Time  0114      Follow-up Information    None         Discharge Medication List as of 7/15/2019  1:59 AM      CONTINUE these medications which have NOT CHANGED    Details   aspirin 500 MG buffered tablet Take 500 mg by mouth daily, Historical Med           No discharge procedures on file     ED Provider  Electronically Signed by           Sonam Bazzi DO  07/15/19 8206

## 2019-07-16 ENCOUNTER — APPOINTMENT (INPATIENT)
Dept: RADIOLOGY | Facility: HOSPITAL | Age: 69
DRG: 552 | End: 2019-07-16
Payer: MEDICARE

## 2019-07-16 VITALS
WEIGHT: 160 LBS | HEART RATE: 98 BPM | HEIGHT: 71 IN | SYSTOLIC BLOOD PRESSURE: 124 MMHG | TEMPERATURE: 98.1 F | OXYGEN SATURATION: 94 % | DIASTOLIC BLOOD PRESSURE: 100 MMHG | BODY MASS INDEX: 22.4 KG/M2 | RESPIRATION RATE: 21 BRPM

## 2019-07-16 PROCEDURE — 97166 OT EVAL MOD COMPLEX 45 MIN: CPT

## 2019-07-16 PROCEDURE — 99238 HOSP IP/OBS DSCHRG MGMT 30/<: CPT | Performed by: SURGERY

## 2019-07-16 PROCEDURE — 97163 PT EVAL HIGH COMPLEX 45 MIN: CPT

## 2019-07-16 PROCEDURE — G8978 MOBILITY CURRENT STATUS: HCPCS

## 2019-07-16 PROCEDURE — G8989 SELF CARE D/C STATUS: HCPCS

## 2019-07-16 PROCEDURE — G8988 SELF CARE GOAL STATUS: HCPCS

## 2019-07-16 PROCEDURE — 71046 X-RAY EXAM CHEST 2 VIEWS: CPT

## 2019-07-16 PROCEDURE — 94760 N-INVAS EAR/PLS OXIMETRY 1: CPT

## 2019-07-16 PROCEDURE — G8987 SELF CARE CURRENT STATUS: HCPCS

## 2019-07-16 PROCEDURE — 99232 SBSQ HOSP IP/OBS MODERATE 35: CPT | Performed by: NEUROLOGICAL SURGERY

## 2019-07-16 PROCEDURE — G8979 MOBILITY GOAL STATUS: HCPCS

## 2019-07-16 RX ORDER — ACETAMINOPHEN 325 MG/1
975 TABLET ORAL EVERY 8 HOURS SCHEDULED
Qty: 30 TABLET | Refills: 0 | Status: SHIPPED | OUTPATIENT
Start: 2019-07-16 | End: 2019-07-30

## 2019-07-16 RX ORDER — POLYETHYLENE GLYCOL 3350 17 G/17G
17 POWDER, FOR SOLUTION ORAL DAILY PRN
Qty: 14 EACH | Refills: 0 | Status: SHIPPED | OUTPATIENT
Start: 2019-07-16 | End: 2019-08-01 | Stop reason: ALTCHOICE

## 2019-07-16 RX ORDER — OXYCODONE HYDROCHLORIDE 5 MG/1
5-10 TABLET ORAL EVERY 4 HOURS PRN
Qty: 36 TABLET | Refills: 0 | Status: SHIPPED | OUTPATIENT
Start: 2019-07-16 | End: 2019-07-26

## 2019-07-16 RX ORDER — METHOCARBAMOL 500 MG/1
500 TABLET, FILM COATED ORAL EVERY 6 HOURS SCHEDULED
Qty: 40 TABLET | Refills: 0 | Status: SHIPPED | OUTPATIENT
Start: 2019-07-16 | End: 2019-07-30

## 2019-07-16 RX ADMIN — ACETAMINOPHEN 975 MG: 325 TABLET ORAL at 13:40

## 2019-07-16 RX ADMIN — OXYCODONE HYDROCHLORIDE 10 MG: 10 TABLET ORAL at 09:40

## 2019-07-16 RX ADMIN — FOLIC ACID 1 MG: 1 TABLET ORAL at 08:58

## 2019-07-16 RX ADMIN — METHOCARBAMOL 500 MG: 500 TABLET, FILM COATED ORAL at 12:15

## 2019-07-16 RX ADMIN — DOCUSATE SODIUM 100 MG: 100 CAPSULE, LIQUID FILLED ORAL at 08:58

## 2019-07-16 RX ADMIN — LIDOCAINE 1 PATCH: 50 PATCH CUTANEOUS at 09:01

## 2019-07-16 RX ADMIN — Medication 1 TABLET: at 08:58

## 2019-07-16 RX ADMIN — METHOCARBAMOL 500 MG: 500 TABLET, FILM COATED ORAL at 00:50

## 2019-07-16 RX ADMIN — METHOCARBAMOL 500 MG: 500 TABLET, FILM COATED ORAL at 05:31

## 2019-07-16 RX ADMIN — THIAMINE HCL TAB 100 MG 100 MG: 100 TAB at 08:58

## 2019-07-16 RX ADMIN — ACETAMINOPHEN 975 MG: 325 TABLET ORAL at 05:30

## 2019-07-16 RX ADMIN — GABAPENTIN 100 MG: 100 CAPSULE ORAL at 16:56

## 2019-07-16 RX ADMIN — GABAPENTIN 100 MG: 100 CAPSULE ORAL at 08:58

## 2019-07-16 NOTE — DISCHARGE INSTRUCTIONS
Traumatic Rib Fracture Discharge Instructions: Activity:  - Walking and normal light activities are encouraged  Normal daily activities including climbing steps are okay  - Avoid lifting greater than 10 pounds, any strenuous activities and/or exercise, and contact sports until cleared by the trauma service  - Continue using the incentive spirometer at least 10 times every hour while awake  Return to work:    - You may return to work once you are cleared by the trauma service  Medications:    - You may resume all of your regular medications, including blood thinners and aspirin, after going home unless otherwise instructed  Please refer to your discharge medication list for further details  - Please take the pain medications as directed  - You are encouraged to use non-narcotic pain medications first and whenever possible  Reserve the use of narcotic pain medication for moderate to severe pain not controlled by non-narcotic medications   - No driving while taking narcotic pain medications  - You may become constipated, especially if taking pain medications  You may take any over the counter stool softeners or laxatives as needed  Examples: Milk of Magnesia, Colace, Senna  Additional Instructions:  - If you have any questions or concerns after discharge please call the office   - Call office or return to ER if fever greater than 101, chills, worsening/uncontrollable pain, develop productive cough, increasing shortness of breath, and/or difficulty breathing

## 2019-07-16 NOTE — INCIDENTAL FINDINGS
The following findings require follow up:  Radiographic finding   Finding: Small hypodense lesions in the liver  Follow up required: Nonemergent liver ultrasound is recommended for further evaluation     Follow up should be done within 1 month(s)    Please notify the following clinician to assist with the follow up:   PCP

## 2019-07-16 NOTE — DISCHARGE SUMMARY
Discharge- Owen Teran 1950, 80 y o  male MRN: [id number]    [facility] Encounter: [id number]    Primary Care Provider: No primary care provider on file  Date and time admitted to hospital: 7/15/2019  2:08 AM        Closed fracture of spinous process of cervical vertebra (HCC)  Assessment & Plan  -Aspen collar in place   - Flex Ex per NSG -   -cervical spine precautions and cervical collar are to remain in place until the x-rays have resulted  -this is likely chronic as patient has no pain or discomfort in the neck          Discharge Summary - Trauma Service   Owen Teran 80 y o  male MRN: [id number]  [facility] Encounter: [id number]    Admission Date: 7/15/2019     Discharge Date: 7/16/2019    Admitting Diagnosis: Multiple injuries [T07  XXXA]  Closed fracture of spinous process of cervical vertebra, initial encounter ([facility] ) [S12  9XXA]    Discharge Diagnosis: C6 spinous process fracture, Left posterior 11th rib fracture    Attending and Service: Julio C[facility]ar An Trauma Service  Consulting Physician(s): Neurosurgery    Imaging and Procedures Performed: No orders of the defined types were placed in this encounter  Hospital Course: Owen Teran is a 50-year-old male who presented as a transfer from an outside hospital  He had a fall the day before presenting and decided to come to hospital due to increased pain in his chest wall  He was scanned at this hospital and noted to have a Left posterior 11th rib fracture and a C6 spinous process fracture  He was transferred to [facility] and placed in a Cervical Collar  This was cleared the following day after a Flexion Extension XR and no clinical tenderness  Patient's pain was becoming more adequately controlled and he was able to pull the inspiratory spirometer adequately         Condition at Discharge: stable     Discharge instructions/Information to patient and family:   See after visit summary for information provided to patient and family  Provisions for Follow-Up Care:  See after visit summary for information related to follow-up care and any pertinent home health orders  Disposition: Home    Planned Readmission: No    Discharge Statement   I spent 30 minutes discharging the patient  This time was spent on the day of discharge  I had direct contact with the patient on the day of discharge  Additional documentation is required if more than 30 minutes were spent on discharge  Discharge Medications:  See after visit summary for reconciled discharge medications provided to patient and family        [address] [facility]  PGY 2 General Surgery

## 2019-07-16 NOTE — PROGRESS NOTES
Progress Note - Todd Has 1950, 80 y o  male MRN: [id number]    Unit/Bed#: [facility] 627-01 Encounter: [id number]    Primary Care Provider: No primary care provider on file  Date and time admitted to hospital: 7/15/2019  2:08 AM        Closed fracture of spinous process of cervical vertebra [facility])  Assessment & Plan  · 7/14/19 - CT cervical wo: 1) Mildly displaced fracture of the C6 spinous process  · 7/15/19 - cervical flexion/extension XR: 1) C6 spinous process fracture which changes position from flexion to extension  2) Mild degenerative osteoarthritic changes of C5-6 and C6-7 levels  · Aspen collar removed by the neurosurgical group  · No midline tenderness  · DVT ppx: SCDs, lovenox  · No anticipated neurosurgical intervention  Neurosurgery signing off, follow up PRN  Fall  Assessment & Plan  · PT/OT evaluation     * Fracture of one rib of left side  Assessment & Plan  · 7/14/19 CT recon thoracolumbar: nondisplaced fracture of left posterior 11th rib  · Anesthesiology consulted, patient does not meet rib fracture protocol  epidural not indicated  · Pain control  · Pulmonary toilet/IS  · Management per primary team, Trauma        Subjective/Objective   Chief Complaint: "I'm doing so-so"    Subjective: patient states he is doing "so-so"  He admits to having left sided rib pain  He states the pain is constant but worse with coughing, deep breaths and movement  He state the pain feels like a "knife is stabbing him"  While he's still the pain is not as bad  He denies any neck pain, chest pain, SOB, abdominal pain/N/V, weakness/numbness or tingling  Objective: sitting up in bed, aspen collar poorly in place    I/O       07/14 0701 - 07/15 0700 07/15 0701 - 07/16 0700 07/16 0701 - 07/17 0700    P  O   1400     Total Intake  1400     Urine  700     Total Output  700     Net  +700            Unmeasured Urine Occurrence   1 x          Invasive Devices     Peripheral Intravenous Line Peripheral IV 07/14/19 Right Antecubital 1 day                Physical Exam:  Vitals: Blood pressure 153/94, pulse 93, temperature 98 1 °F (36 7 °C), resp  rate 16, SpO2 93 %  ,There is no height or weight on file to calculate BMI  General appearance: alert, appears stated age, cooperative and no distress  Head: Normocephalic, without obvious abnormality, atraumatic  Eyes: tracks appropriately in room  Neck: ROM intact, no midline tenderness  Lungs: non labored breathing  Heart: regular heart rate  Neurologic:   Mental status: Alert, oriented x3, thought content appropriate  Speech is fluent, clear  Cranial nerves: grossly intact (Cranial nerves II-XII)  Facial symmetry at rest  Sensory: normal to LT in bilateral upper and lower extremities  DST intact  Motor: moving all extremities without focal weakness, strength 5/5 throughout  Reflexes: 2+ and symmetric  negative graf, negative clonus  Coordination: 3/3 JPS intact in bilateral index fingers  Lab Results:  Results from last 7 days   Lab Units 07/15/19  0436 07/14/19  2142   WBC Thousand/uL 11 06* 12 14*   HEMOGLOBIN g/dL 15 2 15 2   HEMATOCRIT % 43 9 44 8   PLATELETS Thousands/uL 258 272   NEUTROS PCT % 83* 86*   MONOS PCT % 9 7     Results from last 7 days   Lab Units 07/15/19  0436 07/14/19  2142   POTASSIUM mmol/L 3 8 3 8   CHLORIDE mmol/L 104 103   CO2 mmol/L 23 25   BUN mg/dL 15 16   CREATININE mg/dL 0 44* 0 63   CALCIUM mg/dL 8 6 8 5   ALK PHOS U/L  --  77   ALT U/L  --  37   AST U/L  --  37             Results from last 7 days   Lab Units 07/14/19  2142   INR  0 88   PTT seconds 31     No results found for: TROPONINT  ABG:No results found for: PHART, KNZ1VUK, PO2ART, GYS7TGJ, I7RVMIXI, BEART, SOURCE    Imaging Studies: I have personally reviewed pertinent reports     and I have personally reviewed pertinent films in PACS  XR spine cervical complete 4 or 5 vw non injury   Final Result      C6 spinous process fracture which changes position from flexion to extension  Mild degenerative osteoarthritis change of the C5-6 and C6-7 levels  Workstation performed: [id number]         XR chest pa & lateral (24 hours after admission)    (Results Pending)         EKG, Pathology, and Other Studies: I have personally reviewed pertinent reports        VTE  Prophylaxis: Sequential compression device (Venodyne)  and Enoxaparin (Lovenox)

## 2019-07-16 NOTE — PLAN OF CARE
Problem: PHYSICAL THERAPY ADULT  Goal: Performs mobility at highest level of function for planned discharge setting  See evaluation for individualized goals  Description  Treatment/Interventions: Functional transfer training, LE strengthening/ROM, Elevations, Therapeutic exercise, Endurance training, Patient/family training, Equipment eval/education, Bed mobility, Gait training, Spoke to nursing, OT  Equipment Recommended: Benny Hollingsworth       See flowsheet documentation for full assessment, interventions and recommendations  Note:   Prognosis: Good  Problem List: Decreased endurance, Impaired balance, Decreased mobility, Decreased coordination, Pain  Assessment: Pt is a 80 y o  Male admitted to [facility] on 7/15/19 after multiple falls at home resulting in L rib fx  Pt with a significant PMH including alcohol and nicotine dependence  He was seen today for a high complexity PT evaluation, found supine in bed alert and agreeable to therapy  He is reporting 8/10 pain in left ribcage with activity  He lives with his wife in a 1 story home with 13 JIMY, they sleep in the basement, his wife is unable to provide physical assist at home but he does have a local dtr that is available to assist  He was ind with ADLs and mobility with a walking stick PTA  He is currently functioning at a supervision level for bed mobility and a supervision level for transfers, ambulation, and elevations  When ambulating he demonstrates and antalgic pattern with decreased L stance and mild ataxia, his gait pattern improved after ambulating a short distance, he took 2 short standing rest breaks during ambulation  When negotiating stairs pt utilized step to pattern ascending with RLE, he uses a L hand rail and R SPC  At end of evaluation pt left in recliner chair with all needs in reach and all questions answered  Barthel index score is a 70/100 and modified dnaica scale is a 3 at the time of evaluation   PT recommendation is d/c home with home PT referral  Equipment recommendation is to utilize RW for transfers and ambulation at this time  Pt is OK to d/c home when medically cleared  Barriers to Discharge: Inaccessible home environment, Decreased caregiver support  Barriers to Discharge Comments: 15 JIMY, wife unable to assist pt  Recommendation: Home PT     PT - OK to Discharge: Yes    See flowsheet documentation for full assessment

## 2019-07-16 NOTE — UTILIZATION REVIEW
Initial Clinical Review    Admission: Date/Time/Statement: 7/15/19 @ 0308     Orders Placed This Encounter   Procedures    Inpatient Admission     Standing Status:   Standing     Number of Occurrences:   1     Order Specific Question:   Admitting Physician     Answer:   Anastasiia Dunn     Order Specific Question:   Level of Care     Answer:   Med Surg [16]     Order Specific Question:   Estimated length of stay     Answer:   More than 2 Midnights     Order Specific Question:   Certification     Answer:   I certify that inpatient services are medically necessary for this patient for a duration of greater than two midnights  See H&P and MD Progress Notes for additional information about the patient's course of treatment  ED Arrival Information     Expected Arrival Acuity Means of Arrival Escorted By Service Admission Type    7/15/2019  7/15/2019 02:08 Immediate Ambulance [facility]) Trauma Urgent    Arrival Complaint    -        Chief Complaint   Patient presents with    Multiple Falls     Pt states on Friday night he fell into his wifes commode and hit left side of rib, then on saturday night pt states he fell again into a car bumper striking head  +LOC with both occasions, pt also admits to taking aspirin after falling and then going to sleep saturday night  Assessment/Plan:     80 y o  male who presents as a transfer from [address] [place] [address] where he presented after having more pain with ambulation and difficulty breathing  Patient is a known alcoholic and smoker  Patient states he has fallen multiple times in the last few days and has struck head however does not have any loss of consciousness  Patient's main complaint is inability to take deep breaths and cough to clear phlegm  Did not realize he had any neck pain however upon scan at [facility] showed a C6 spinous process fracture  [place]   GCS 15  Mechanism:Fall  Trauma Plan:   Admit to trauma, Med surge  Rib fracture protocol next 9 aspirin collar  Cervical precautions  Consult neuro surgery  Hold DVT prophylaxis pending neurosurgical evaluation  Repeat chest x-ray later today 715    NEUROSURGERY  Closed fracture of spinous process of cervical vertebra (HCC)  Assessment & Plan  · 7/14/19 - CT cervical wo: 1) Mildly displaced fracture of the C6 spinous process  · Aspen collar in place during examination  · No midline tenderness  ? Ordered flexion/extension films  Collar should be maintained until x-rays completed  · DVT ppx: SCDs  · No anticipated neurosurgical intervention   Pending x-rays       Fall  Assessment & Plan  · PT/OT evaluation      * Fracture of one rib of left side  Assessment & Plan  · 7/14/19 CT recon thoracolumbar: nondisplaced fracture of left posterior 11th rib  · Pain control  · Pulmonary toilet/IS  · Management per primary team, Trauma      ED Triage Vitals   Temperature Pulse Respirations Blood Pressure SpO2   07/15/19 0214 07/15/19 0214 07/15/19 0214 07/15/19 0214 07/15/19 0214   97 8 °F (36 6 °C) 90 20 168/88 96 %      Temp Source Heart Rate Source Patient Position - Orthostatic VS BP Location FiO2 (%)   07/15/19 0214 07/15/19 0214 07/15/19 0214 07/15/19 0438 --   Oral Monitor Lying Right arm       Pain Score       07/15/19 0214       6        Wt Readings from Last 1 Encounters:   07/14/19 72 6 kg (160 lb)     Additional Vital Signs:   07/16/19 0714  98 1 °F (36 7 °C)  93    153/94  93 %    07/15/19 1500    77    132/78  96 %    07/15/19 1300    106Abnormal     140/80      07/15/19 0837          95 %    07/15/19 0800    82    140/84      07/15/19 0730    76  16  152/82  94 %    07/15/19 0600    96  18  155/85  96 %    07/15/19 0438    87  18  158/93  98 %    07/15/19 0420          96 %    07/15/19 0315    86  18  167/91  96 %        Pertinent Labs/Diagnostic Test Results:   Results from last 7 days   Lab Units 07/15/19  0436 07/14/19  2142   WBC Thousand/uL 11 06* 12 14*   HEMOGLOBIN g/dL 15 2 15 2 HEMATOCRIT % 43 9 44 8   PLATELETS Thousands/uL 258 272   NEUTROS ABS Thousands/µL 9 05* 10 32*       Results from last 7 days   Lab Units 07/15/19  0436 07/14/19  2142   SODIUM mmol/L 137 140   POTASSIUM mmol/L 3 8 3 8   CHLORIDE mmol/L 104 103   CO2 mmol/L 23 25   ANION GAP mmol/L 10 12   BUN mg/dL 15 16   CREATININE mg/dL 0 44* 0 63   EGFR ml/min/1 73sq m 118 102   CALCIUM mg/dL 8 6 8 5     Results from last 7 days   Lab Units 07/14/19  2142   AST U/L 37   ALT U/L 37   ALK PHOS U/L 77   TOTAL PROTEIN g/dL 6 7   ALBUMIN g/dL 3 3*   TOTAL BILIRUBIN mg/dL 0 90     Results from last 7 days   Lab Units 07/15/19  0436 07/14/19  2142   GLUCOSE RANDOM mg/dL 71 73     Results from last 7 days   Lab Units 07/14/19  2142   PROTIME seconds 9 3*   INR  0 88   PTT seconds 31     Results from last 7 days   Lab Units 07/14/19  2142   ETHANOL LVL mg/dL <3       ED Treatment:   Medication Administration from 07/15/2019 0204 to 07/15/2019 4100       Date/Time Order Dose Route Action     07/15/2019 0611 acetaminophen (TYLENOL) tablet 975 mg 975 mg Oral Given     07/15/2019 0611 methocarbamol (ROBAXIN) tablet 500 mg 500 mg Oral Given     07/15/2019 0339 oxyCODONE (ROXICODONE) immediate release tablet 10 mg 10 mg Oral Given        Past Medical History:   Diagnosis Date    Alcohol dependency ([facility] )     Nicotine dependence      Present on Admission:   Closed fracture of spinous process of cervical vertebra (HCC)   Fracture of one rib of left side      Admitting Diagnosis: Multiple injuries [T07  XXXA]  Closed fracture of spinous process of cervical vertebra, [facility] ([facility] ) [S12  9XXA]  Age/Sex: 80 y o  male  Admission Orders:    Current Facility-Administered Medications:  acetaminophen 975 mg Oral Q8H [facility]   butalbital-acetaminophen-caffeine 1 tablet Oral Once   docusate sodium 100 mg Oral BID   enoxaparin 30 mg Subcutaneous N89F [facility]   folic acid 1 mg Oral Daily   gabapentin 100 mg Oral TID   HYDROmorphone 0 5 mg Intravenous Q3H PRN   lidocaine 1 patch Topical Daily   methocarbamol 500 mg Oral Q6H [address]   multivitamin-minerals 1 tablet Oral Daily   nicotine 1 patch Transdermal Daily   oxyCODONE 10 mg Oral Q4H PRN   oxyCODONE 5 mg Oral Q4H PRN   polyethylene glycol 17 g Oral Daily   thiamine 100 mg Oral Daily       IP CONSULT TO CASE MANAGEMENT  IP CONSULT TO ACUTE PAIN SERVICE  IP CONSULT TO NEUROSURGERY  IP CONSULT TO ALCOHOL BRIEF INTERVENTION TRAUMA   PT/OT  NEURO Q 4 HRS   SCD  CIWA SCORE 0     Network Utilization Review Department  Phone: [phone]; Fax [phone]  [email or web address]  ATTENTION: Please call with any questions or concerns to [phone]  and carefully listen to the prompts so that you are directed to the right person  Send all requests for admission clinical reviews, approved or denied determinations and any other requests to fax [phone]   All voicemails are confidential

## 2019-07-16 NOTE — OCCUPATIONAL THERAPY NOTE
[address] Evaluation     Patient Name: Rena Epps  Today's Date: 7/16/2019  Problem List  Patient Active Problem List   Diagnosis    Closed fracture of spinous process of cervical vertebra ([facility] )    Fracture of one rib of left side    Fall     Past Medical History  Past Medical History:   Diagnosis Date    Alcohol dependency ([facility] )     Nicotine dependence      Past Surgical History  Past Surgical History:   Procedure Laterality Date    TONSILLECTOMY           07/16/19 1106   Note Type   Note type Eval only   Restrictions/Precautions   Weight Bearing Precautions Per Order No   Braces or Orthoses   (C-COLLAR CLEARED BY NEUROSX PRIOR TO EVAL)   Other Precautions Fall Risk;Pain   Pain Assessment   Pain Assessment 0-10   Pain Score 8   Pain Type Acute pain   Pain Location Rib cage   Pain Orientation Left   Hospital Pain Intervention(s) Repositioned; Ambulation/increased activity; Distraction; Emotional support   Response to Interventions INCREASED WITH ACTIVITY    Home Living   Type of [address] [place] [address] One level; Work area in basement;Stairs to enter with rails  ([address] )   Bathroom Shower/Tub Tub/shower unit   [address] [place] [address]  chair;[address]   Additional [address] PTA  PT REPORTS CURRENLY WITH BEDROOM IN BASEMENT HOWEVER PLANS TO HAVE FAMILY BRING TO 1ST LEVEL  OWNS SC AND BSC    Prior Function   Level of [place] Independent with ADLs and functional mobility   Lives With Spouse   Receives Help From Family   ADL Assistance Independent   IADLs Independent   Falls in the last 6 months 5 to 10   Vocational Retired   Lifestyle   Autonomy  [facility] ADLS/IADLS PTA    Reciprocal Relationships LIVES WITH SPOUSE WHO HAS RA AND UNABLE OT PROVIDE MUCH ASSIST   SUPPORTIVE DAUGHTER ABLE TO ASSIST    Service to Others RETIRED   Intrinsic Gratification ENJOYS DOING THINGS AROUND THE HOME    Psychosocial   Psychosocial (WDL) WDL   ADL   Eating Assistance 7  Independent   Grooming Assistance 7  Independent   UB Bathing Assistance 5  Supervision/Setup   LB Bathing Assistance 5  Supervision/Setup   UB Dressing Assistance 5  Supervision/Setup   LB Dressing Assistance 5  Supervision/Setup   Toileting Assistance  5  Supervision/Setup   Functional Assistance 5  Supervision/Setup   Bed Mobility   Supine to Sit 5  Supervision   Additional items Increased time required;Verbal cues   Sit to Supine Unable to assess  (PT LEFT OOB WITH ALL NEEDS IN REACH )   Transfers   Sit to Stand 5  Supervision   Additional items Assist x 1; Increased time required;Verbal cues   Stand to Sit 5  Supervision   Additional items Assist x 1; Increased time required;Verbal cues   Functional Mobility   Functional Mobility 5  Supervision   Additional Comments PT WITH ABNORMAL GAIT PATTERN-INCONSISTENT AT TIMES  SEE PT EVAL FOR DETAILS  Additional items Rolling walker   Balance   Static Sitting Fair +   Static Standing Fair -   Ambulatory Fair -   Activity Tolerance   Activity Tolerance Patient limited by pain   Medical Staff Made Aware SPOKE TO CM REGARDING D/C PLAN    Nurse Made Aware APPROPRIATE TO SEE    RUE Assessment   RUE Assessment WFL   LUE Assessment   LUE Assessment WFL   Hand Function   Gross Motor Coordination Functional   Fine Motor Coordination Functional   Sensation   Light Touch No apparent deficits   Cognition   Overall Cognitive Status WFL   Arousal/Participation Alert; Cooperative   Attention Attends with cues to redirect   Orientation Level Oriented X4   Memory Within functional limits   Following Commands Follows all commands and directions without difficulty   Comments PT IS PLEASANT AND COOPERATIVE   MILDLY SELF-LIMITING AT TIMES   Assessment   Assessment 79 YO Male SEEN FOR INITIAL OCCUPATIONAL THERAPY EVALUATION FOLLOWING TXF FROM [facility]  S/P FALL RESULTING IN L-SIDED RIB FX, AND CLOSED FX OF SPINOUS PROCESS OF CERVICAL VEREBRA  C-COLLAR CLEARED BY NEUROSX PRIOR TO EVAL  PT WITH ALCOHOL DEPENDENCE  PT IS FROM HOME WITH SPOUSE WHERE HE REPORTS BEING INDEPENDENT WITH ADLS/IADLS/DRIVING PTA  PT CURRENTLY REQUIRES OVERALL SUPERVISION WITH ADLS, TRANSFERS AND FUNCTIONAL MOBILITY WITH USE OF RW  PT IS EXPERIENCING EXPECTED 2' PAIN, FATIGUE, IMPAIRED BALANCE, FALL RISK/ MULTIPLE FALLS, LIMITED SAFETY AWARENESS/INSIGHT/JUDGEMENT WITH ALCOHOL ABUSE and OVERALL LIMITED ACTIVITY TOLERANCE  PT EDUCATED ON DEEP BREATHING TECHNIQUES T/O ACTIVITY, SLOWING OF PACE, ENERGY CONSERVATION TECHNIQUES FOR CARRY OVER UPON D/C, INCREASED FAMILY SUPPORT and CONTINUE PARTICIPATION IN SELF-CARE/MOBILITY WITH STAFF [address]   FROM AN OCCUPATIONAL THERAPY PERSPECTIVE, PT CAN RETURN HOME WITH INCREASED FAMILY SUPPORT WHEN MEDICALLY CLEARED  ALL CURRENT QUESTIONS/CONCERNS ADDRESSED  NO ADDITIONAL ACUTE CARE OT NEEDS  D/C OT      Goals   Patient Goals TO RETURN HOME    Recommendation   OT Discharge Recommendation Home with family support   OT - OK to Discharge Yes   Barthel Index   Feeding 10   Bathing 0   Grooming Score 5   Dressing Score 10   Bladder Score 10   Bowels Score 10   Toilet Use Score 10   Transfers (Bed/Chair) Score 10   Mobility (Level Surface) Score 10   Stairs Score 5   Barthel Index Score 80   Modified Eastland Scale   Modified Eastland Scale 3       Documentation completed by Chelsey Mead, FADY, OTR/L

## 2019-07-16 NOTE — ASSESSMENT & PLAN NOTE
-Aspen collar in place   - Flex Ex per NSG -   -cervical spine precautions and cervical collar are to remain in place until the x-rays have resulted  -this is likely chronic as patient has no pain or discomfort in the neck

## 2019-07-16 NOTE — PHYSICIAN ADVISOR
Current patient class: Inpatient  The patient is currently on Hospital Day: 2 at [facility]      The patient was admitted to the hospital at [address] [place] [address] on 7/15/19 for the following diagnosis:  Multiple injuries [T07  XXXA]  Closed fracture of spinous process of cervical vertebra, initial encounter ([facility] ) [S12  9XXA]       There is documentation in the medical record of an expected length of stay of at least 2 midnights  The patient is therefore expected to satisfy the 2 midnight benchmark and given the 2 midnight presumption is appropriate for INPATIENT ADMISSION  Given this expectation of a satisfying stay, CMS instructs us that the patient is most often appropriate for inpatient admission under part A provided medical necessity is documented in the chart  After review of the relevant documentation, labs, vital signs and test results, the patient is appropriate for INPATIENT ADMISSION  Admission to the hospital as an inpatient is a complex decision making process which requires the practitioner to consider the patients presenting complaint, history and physical examination and all relevant testing  With this in mind, in this case, the patient was deemed appropriate for INPATIENT ADMISSION  After review of the documentation and testing available at the time of the admission I concur with this clinical determination of medical necessity  Rationale is as follows: The patient is a 64-year-old male who presented to the emergency room at [facility] on 07/14/2019 with a chief complaint of multiple falls with left chest wall pain, head injury and loss of consciousness  Imaging revealed C6 spinous process fracture and left-sided rib fracture  Patient was transferred to [facility] but via hospital for further evaluation and neuro surgical consultation  He was placed in cervical collar  Per Neurosurgery no surgical intervention    Patient's pain was treated with hydromorphone and oxycodone  Respiratory protocol with incentive spirometry  Patient was deemed safe for discharge on 07/16/2019  Inpatient level of care was appropriate  The patients vitals on arrival were ED Triage Vitals   Temperature Pulse Respirations Blood Pressure SpO2   07/15/19 0214 07/15/19 0214 07/15/19 0214 07/15/19 0214 07/15/19 0214   97 8 °F (36 6 °C) 90 20 168/88 96 %      Temp Source Heart Rate Source Patient Position - Orthostatic VS BP Location FiO2 (%)   07/15/19 0214 07/15/19 0214 07/15/19 0214 07/15/19 0438 --   Oral Monitor Lying Right arm       Pain Score       07/15/19 0214       6           Past Medical History:   Diagnosis Date    [facility] dependency ([facility] )     Nicotine dependence      Past Surgical History:   Procedure Laterality Date    TONSILLECTOMY             Consults have been placed to:   IP CONSULT TO ACUTE PAIN SERVICE  IP CONSULT TO NEUROSURGERY    Vitals:    07/16/19 1025 07/16/19 1400 07/16/19 1556 07/16/19 1557   BP:   124/100 124/100   BP Location:       Pulse:    98   Resp:    21   Temp:    98 1 °F (36 7 °C)   TempSrc:       SpO2:  96%  94%   Weight: 72 6 kg (160 lb)      Height: 5' 11" (1 803 m)          Most recent labs:    Recent Labs     07/14/19  2142 07/15/19  0436   WBC 12 14* 11 06*   HGB 15 2 15 2   HCT 44 8 43 9    258   K 3 8 3 8   CALCIUM 8 5 8 6   BUN 16 15   CREATININE 0 63 0 44*   INR 0 88  --    AST 37  --    ALT 37  --    ALKPHOS 77  --        Scheduled Meds:  Continuous Infusions:  No current facility-administered medications for this encounter  PRN Meds:      Surgical procedures (if appropriate):

## 2019-07-16 NOTE — PHYSICAL THERAPY NOTE
Physical Therapy Evaluation     Patient's Name: Randolph Lyons    Admitting Diagnosis  Multiple injuries [T07  XXXA]  Closed fracture of spinous process of cervical vertebra, initial encounter ([facility] ) [S12  9XXA]    Problem List  Patient Active Problem List   Diagnosis    Closed fracture of spinous process of cervical vertebra (HCC)    Fracture of one rib of left side    Fall       Past Medical History  Past Medical History:   Diagnosis Date    [facility] dependency ([facility] )     Nicotine dependence        Past Surgical History  Past Surgical History:   Procedure Laterality Date    TONSILLECTOMY        07/16/19 1105   Note Type   Note type Eval only   Pain Assessment   Pain Assessment 0-10   Pain Score 8  (with movement)   Pain Type Acute pain   Pain Location Rib cage   Pain Orientation Left   Hospital Pain Intervention(s) Ambulation/increased activity;Repositioned   Home Living   Type of [address] [place] [address] One level;Stairs to enter with rails  ([address], sleeps in basement, bathroom on 1st floor )   Bathroom Shower/Tub Tub/shower unit   Bathroom Toilet Standard   Bathroom Equipment Shower chair;Commode   [facility]  (walking stick)   Additional Comments pt lives with wife in a single story home with basement that they sleep in, pt reports they have commode and shower chair 2* wifes physical difficulties   Prior Function   Level of [place] Independent with ADLs and functional mobility   Lives With Spouse   Receives Help From [facility] [place], [address] 7 in the last 6 months 5 to 10   Vocational Retired   Comments pt wife unable to provide physical assist to pt he does have a local dtr that can be available to assist, pt reports ind with ADLs and mobility with walking stick PTA   Restrictions/Precautions   Braces or Orthoses   (c-collar cleared by NSX prior to eval)   Other Precautions Pain; Fall Risk   General   Family/Caregiver Present No   Cognition   Overall Cognitive Status WFL   Arousal/Participation Alert   Orientation Level Oriented X4   Memory Within functional limits   Following Commands Follows all commands and directions without difficulty   Comments pt is pleasant and agreeable to therapy   RLE Assessment   RLE Assessment WFL   LLE Assessment   LLE Assessment WFL   Coordination   Movements are Fluid and Coordinated 0   Coordination and Movement Description pt with minor ataxia in BLE    Sensation WFL   Bed Mobility   Supine to Sit 5  Supervision   Additional items Increased time required;Verbal cues   Sit to Supine Unable to assess  (pt left in bedside recliner)   Transfers   Sit to Stand 5  Supervision  (Simultaneous filing  User may not have seen previous data )   Additional items Assist x 1; Increased time required;Verbal cues;Armrests   Stand to Sit 5  Supervision  (Simultaneous filing  User may not have seen previous data )   Additional items Assist x 1; Increased time required;Verbal cues;Armrests   Ambulation/Elevation   Gait pattern Improper Weight shift; Antalgic;Narrow ROSA MARIA; Forward Flexion;Decreased foot clearance;Decreased L stance; Inconsistent beulah; Short stride; Excessively slow   Gait Assistance 5  Supervision   Additional items Assist x 1;Verbal cues   Assistive Device Rolling walker   Distance 200ft   Stair Management Assistance 5  Supervision   Additional items Assist x 1;Verbal cues; Increased time required   Stair Management Technique One rail L;With cane; Step to pattern; Foreward   Number of Stairs 3   Ramp Technique Not tested   Balance   Static Sitting Fair +   Dynamic Sitting Fair   Static Standing Fair -   Dynamic Standing Fair -   Ambulatory Fair -   Endurance Deficit   Endurance Deficit Yes   Endurance Deficit Description pain in ribs   Activity Tolerance   Activity Tolerance Patient limited by pain   Medical Staff Made Aware OT   Nurse Made Aware nsg gave OK to see pt   Assessment   Prognosis Good   Problem List Decreased endurance; Impaired balance;Decreased mobility; Decreased coordination;Pain   Assessment Pt is a 80 y o  Male admitted to [facility] on 7/15/19 after multiple falls at home resulting in L rib fx  Pt with a significant PMH including alcohol and nicotine dependence  He was seen today for a high complexity PT evaluation, found supine in bed alert and agreeable to therapy  He is reporting 8/10 pain in left ribcage with activity  He lives with his wife in a 1 story home with [address], they sleep in the basement, his wife is unable to provide physical assist at home but he does have a local dtr that is available to assist  He was ind with ADLs and mobility with a walking stick PTA  He is currently functioning at a supervision level for bed mobility and a supervision level for transfers, ambulation, and elevations  When ambulating he demonstrates and antalgic pattern with decreased L stance and mild ataxia, his gait pattern improved after ambulating a short distance, he took 2 short standing rest breaks during ambulation  When negotiating stairs pt utilized step to pattern ascending with RLE, he uses a L hand rail and R SPC  At end of evaluation pt left in recliner chair with all needs in reach and all questions answered  Barthel index score is a 70/100 and modified danica scale is a 3 at the time of evaluation  PT recommendation is d/c home with home PT referral  Equipment recommendation is to utilize RW for transfers and ambulation at this time  Pt is OK to d/c home when medically cleared  Barriers to Discharge Inaccessible home environment;Decreased caregiver support   Barriers to Discharge Comments [address], wife unable to assist pt   Goals   Patient Goals to go home   STG Expiration Date 07/23/19   Short Term Goal #1 1) Pt will complete bed mobility mod I to reduce burden of care  2) Pt will complete transfers mod I with RW to reduce burden of care   3) Pt will ambulate 300 ft mod I with RW to move within community without assist  4) Pt will complete a FF stairs mod I with SPC and 1 Rail to enter home without assist  5) Pt will improve standing balance to fair to reduce risk of falls when mobilizing  Plan   Treatment/Interventions Functional transfer training;LE strengthening/ROM; Elevations; Therapeutic exercise; Endurance training;Patient/family training;Equipment eval/education; Bed mobility;Gait training;Spoke to nursing;OT   PT Frequency   (3-5xwk)   Recommendation   Recommendation Home PT   Equipment Recommended Walker;Cane   PT - OK to Discharge Yes   Additional Comments pt is OK to d/c home when medically cleared   Modified Rogers Scale   Modified Kat Scale 3   Barthel Index   Feeding 5   Bathing 0   Grooming Score 5   Dressing Score 10   Bladder Score 10   Bowels Score 10   Toilet Use Score 5   Transfers (Bed/Chair) Score 10   Mobility (Level Surface) Score 10   Stairs Score 5   Barthel Index Score 70       Luis Saunders, SPT

## 2019-07-16 NOTE — ASSESSMENT & PLAN NOTE
· 7/14/19 CT recon thoracolumbar: nondisplaced fracture of left posterior 11th rib  · Anesthesiology consulted, patient does not meet rib fracture protocol   epidural not indicated  · Pain control  · Pulmonary toilet/IS  · Management per primary team, Trauma

## 2019-07-16 NOTE — PLAN OF CARE
Problem: Potential for Falls  Goal: Patient will remain free of falls  Description  INTERVENTIONS:  - Assess patient frequently for physical needs  -  Identify cognitive and physical deficits and behaviors that affect risk of falls    -  [place] fall precautions as indicated by assessment   - Educate patient/family on patient safety including physical limitations  - Instruct patient to call for assistance with activity based on assessment  - Modify environment to reduce risk of injury  - Consider OT/PT consult to assist with strengthening/mobility  Outcome: Progressing     Problem: PAIN - ADULT  Goal: Verbalizes/displays adequate comfort level or baseline comfort level  Description  Interventions:  - Encourage patient to monitor pain and request assistance  - Assess pain using appropriate pain scale  - Administer analgesics based on type and severity of pain and evaluate response  - Implement non-pharmacological measures as appropriate and evaluate response  - Consider cultural and social influences on pain and pain management  - Notify physician/advanced practitioner if interventions unsuccessful or patient reports new pain  Outcome: Progressing     Problem: INFECTION - ADULT  Goal: Absence or prevention of progression during hospitalization  Description  INTERVENTIONS:  - Assess and monitor for signs and symptoms of infection  - Monitor lab/diagnostic results  - Monitor all insertion sites, i e  indwelling lines, tubes, and drains  - Monitor endotracheal (as able) and nasal secretions for changes in amount and color  - [place] appropriate cooling/warming therapies per order  - Administer medications as ordered  - Instruct and encourage patient and family to use good hand hygiene technique  - Identify and instruct in appropriate isolation precautions for identified infection/condition  Outcome: Progressing     Problem: SAFETY ADULT  Goal: Maintain or return to baseline ADL function  Description  INTERVENTIONS:  -  Assess patient's ability to carry out ADLs; assess patient's baseline for ADL function and identify physical deficits which impact ability to perform ADLs (bathing, care of mouth/teeth, toileting, grooming, dressing, etc )  - Assess/evaluate cause of self-care deficits   - Assess range of motion  - Assess patient's mobility; develop plan if impaired  - Assess patient's need for assistive devices and provide as appropriate  - Encourage maximum independence but intervene and supervise when necessary  ¯ Involve family in performance of ADLs  ¯ Assess for home care needs following discharge   ¯ Request OT consult to assist with ADL evaluation and planning for discharge  ¯ Provide patient education as appropriate  Outcome: Progressing  Goal: Maintain or return mobility status to optimal level  Description  INTERVENTIONS:  - Assess patient's baseline mobility status (ambulation, transfers, stairs, etc )    - Identify cognitive and physical deficits and behaviors that affect mobility  - Identify mobility aids required to assist with transfers and/or ambulation (gait belt, sit-to-stand, lift, walker, cane, etc )  - [place] fall precautions as indicated by assessment  - Record patient progress and toleration of activity level on Mobility SBAR; progress patient to next Phase/Stage  - Instruct patient to call for assistance with activity based on assessment  - Request Rehabilitation consult to assist with strengthening/weightbearing, etc   Outcome: Progressing     Problem: DISCHARGE PLANNING  Goal: Discharge to home or other facility with appropriate resources  Description  INTERVENTIONS:  - Identify barriers to discharge w/patient and caregiver  - Arrange for needed discharge resources and transportation as appropriate  - Identify discharge learning needs (meds, wound care, etc )  - Arrange for interpretive services to assist at discharge as needed  - Refer to Case Management Department for coordinating discharge planning if the patient needs post-hospital services based on physician/advanced practitioner order or complex needs related to functional status, cognitive ability, or social support system  Outcome: Progressing     Problem: Knowledge Deficit  Goal: Patient/family/caregiver demonstrates understanding of disease process, treatment plan, medications, and discharge instructions  Description  Complete learning assessment and assess knowledge base    Interventions:  - Provide teaching at level of understanding  - Provide teaching via preferred learning methods  Outcome: Progressing

## 2019-07-16 NOTE — ASSESSMENT & PLAN NOTE
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

## 2019-08-01 ENCOUNTER — OFFICE VISIT (OUTPATIENT)
Dept: FAMILY MEDICINE CLINIC | Facility: CLINIC | Age: 69
End: 2019-08-01
Payer: MEDICARE

## 2019-08-01 VITALS
WEIGHT: 155 LBS | SYSTOLIC BLOOD PRESSURE: 110 MMHG | RESPIRATION RATE: 18 BRPM | HEIGHT: 71 IN | BODY MASS INDEX: 21.7 KG/M2 | HEART RATE: 100 BPM | DIASTOLIC BLOOD PRESSURE: 62 MMHG | TEMPERATURE: 99.1 F

## 2019-08-01 DIAGNOSIS — Z12.11 SCREENING FOR COLORECTAL CANCER: Primary | ICD-10-CM

## 2019-08-01 DIAGNOSIS — Z13.6 SCREENING FOR AAA (ABDOMINAL AORTIC ANEURYSM): ICD-10-CM

## 2019-08-01 DIAGNOSIS — Z00.00 MEDICARE ANNUAL WELLNESS VISIT, INITIAL: ICD-10-CM

## 2019-08-01 DIAGNOSIS — Z12.12 SCREENING FOR COLORECTAL CANCER: Primary | ICD-10-CM

## 2019-08-01 DIAGNOSIS — F17.218 CIGARETTE NICOTINE DEPENDENCE WITH OTHER NICOTINE-INDUCED DISORDER: ICD-10-CM

## 2019-08-01 DIAGNOSIS — K76.9 LIVER LESION: ICD-10-CM

## 2019-08-01 DIAGNOSIS — Z11.59 NEED FOR HEPATITIS C SCREENING TEST: ICD-10-CM

## 2019-08-01 DIAGNOSIS — S12.9XXA CLOSED FRACTURE OF SPINOUS PROCESS OF CERVICAL VERTEBRA, INITIAL ENCOUNTER (HCC): ICD-10-CM

## 2019-08-01 DIAGNOSIS — S22.32XA CLOSED FRACTURE OF ONE RIB OF LEFT SIDE, INITIAL ENCOUNTER: ICD-10-CM

## 2019-08-01 DIAGNOSIS — F32.89 OTHER DEPRESSION: ICD-10-CM

## 2019-08-01 DIAGNOSIS — Z12.5 SCREENING PSA (PROSTATE SPECIFIC ANTIGEN): ICD-10-CM

## 2019-08-01 DIAGNOSIS — H69.83 EUSTACHIAN TUBE DYSFUNCTION, BILATERAL: ICD-10-CM

## 2019-08-01 DIAGNOSIS — Z13.6 SCREENING FOR CARDIOVASCULAR CONDITION: ICD-10-CM

## 2019-08-01 PROCEDURE — G0438 PPPS, INITIAL VISIT: HCPCS | Performed by: NURSE PRACTITIONER

## 2019-08-01 PROCEDURE — 99203 OFFICE O/P NEW LOW 30 MIN: CPT | Performed by: NURSE PRACTITIONER

## 2019-08-01 NOTE — PATIENT INSTRUCTIONS
Flonase 2 sprays in each nostril daily  Cortisone cream over the counter to your right ear  Obesity   AMBULATORY CARE:   Obesity  is when your body mass index (BMI) is greater than 30  Your healthcare provider will use your height and weight to measure your BMI  The risks of obesity include  many health problems, such as injuries or physical disability  You may need tests to check for the following:  · Diabetes     · High blood pressure or high cholesterol     · Heart disease     · Gallbladder or liver disease     · Cancer of the colon, breast, prostate, liver, or kidney     · Sleep apnea     · Arthritis or gout  Seek care immediately if:   · You have a severe headache, confusion, or difficulty speaking  · You have weakness on one side of your body  · You have chest pain, sweating, or shortness of breath  Contact your healthcare provider if:   · You have symptoms of gallbladder or liver disease, such as pain in your upper abdomen  · You have knee or hip pain and discomfort while walking  · You have symptoms of diabetes, such as intense hunger and thirst, and frequent urination  · You have symptoms of sleep apnea, such as snoring or daytime sleepiness  · You have questions or concerns about your condition or care  Treatment for obesity  focuses on helping you lose weight to improve your health  Even a small decrease in BMI can reduce the risk for many health problems  Your healthcare provider will help you set a weight-loss goal   · Lifestyle changes  are the first step in treating obesity  These include making healthy food choices and getting regular physical activity  Your healthcare provider may suggest a weight-loss program that involves coaching, education, and therapy  · Medicine  may help you lose weight when it is used with a healthy diet and physical activity  · Surgery  can help you lose weight if you are very obese and have other health problems   There are several types of weight-loss surgery  Ask your healthcare provider for more information  Be successful losing weight:   · Set small, realistic goals  An example of a small goal is to walk for 20 minutes 5 days a week  Twin goal is to lose 5% of your body weight  · Tell friends, family members, and coworkers about your goals  and ask for their support  Ask a friend to lose weight with you, or join a weight-loss support group  · Identify foods or triggers that may cause you to overeat , and find ways to avoid them  Remove tempting high-calorie foods from your home and workplace  Place a bowl of fresh fruit on your kitchen counter  If stress causes you to eat, then find other ways to cope with stress  · Keep a diary to track what you eat and drink  Also write down how many minutes of physical activity you do each day  Weigh yourself once a week and record it in your diary  Eating changes: You will need to eat 500 to 1,000 fewer calories each day than you currently eat to lose 1 to 2 pounds a week  The following changes will help you cut calories:  · Eat smaller portions  Use small plates, no larger than 9 inches in diameter  Fill your plate half full of fruits and vegetables  Measure your food using measuring cups until you know what a serving size looks like  · Eat 3 meals and 1 or 2 snacks each day  Plan your meals in advance  Mariana Paez and eat at home most of the time  Eat slowly  · Eat fruits and vegetables at every meal   They are low in calories and high in fiber, which makes you feel full  Do not add butter, margarine, or cream sauce to vegetables  Use herbs to season steamed vegetables  · Eat less fat and fewer fried foods  Eat more baked or grilled chicken and fish  These protein sources are lower in calories and fat than red meat  Limit fast food  Dress your salads with olive oil and vinegar instead of bottled dressing  · Limit the amount of sugar you eat    Do not drink sugary beverages  Limit alcohol  Activity changes:  Physical activity is good for your body in many ways  It helps you burn calories and build strong muscles  It decreases stress and depression, and improves your mood  It can also help you sleep better  Talk to your healthcare provider before you begin an exercise program   · Exercise for at least 30 minutes 5 days a week  Start slowly  Set aside time each day for physical activity that you enjoy and that is convenient for you  It is best to do both weight training and an activity that increases your heart rate, such as walking, bicycling, or swimming  · Find ways to be more active  Do yard work and housecleaning  Walk up the stairs instead of using elevators  Spend your leisure time going to events that require walking, such as outdoor festivals or fairs  This extra physical activity can help you lose weight and keep it off  Follow up with your healthcare provider as directed: You may need to meet with a dietitian  Write down your questions so you remember to ask them during your visits  © 2017 [employer]  Information is for End User's use only and may not be sold, redistributed or otherwise used for commercial purposes  All illustrations and images included in CareNotes® are the copyrighted property of [employer] M [employer]  or Chester Viveros  The above information is an  only  It is not intended as medical advice for individual conditions or treatments  Talk to your doctor, nurse or pharmacist before following any medical regimen to see if it is safe and effective for you  Urinary Incontinence   WHAT YOU NEED TO KNOW:   What is urinary incontinence? Urinary incontinence (UI) is when you lose control of your bladder  What causes UI? UI occurs because your bladder cannot store or empty urine properly   The following are the most common types of UI:  · Stress incontinence  is when you leak urine due to increased bladder pressure  This may happen when you cough, sneeze, or exercise  · Urge incontinence  is when you feel the need to urinate right away and leak urine accidentally  · Mixed incontinence  is when you have both stress and urge UI  What are the signs and symptoms of UI?   · You feel like your bladder does not empty completely when you urinate  · You urinate often and need to urinate immediately  · You leak urine when you sleep, or you wake up with the urge to urinate  · You leak urine when you cough, sneeze, exercise, or laugh  How is UI diagnosed? Your healthcare provider will ask how often you leak urine and whether you have stress or urge symptoms  Tell him which medicines you take, how often you urinate, and how much liquid you drink each day  You may need any of the following tests:  · Urine tests  may show infection or kidney function  · A pelvic exam  may be done to check for blockages  A pelvic exam will also show if your bladder, uterus, or other organs have moved out of place  · An x-ray, ultrasound, or CT  may show problems with parts of your urinary system  You may be given contrast liquid to help your organs show up better in the pictures  Tell the healthcare provider if you have ever had an allergic reaction to contrast liquid  Do not enter the MRI room with anything metal  Metal can cause serious injury  Tell the healthcare provider if you have any metal in or on your body  · A bladder scan  will show how much urine is left in your bladder after you urinate  You will be asked to urinate and then healthcare providers will use a small ultrasound machine to check the urine left in your bladder  · Cystometry  is used to check the function of your urinary system  Your healthcare provider checks the pressure in your bladder while filling it with fluid  Your bladder pressure may also be tested when your bladder is full and while you urinate  How is UI treated?    · Medicines  can help strengthen your bladder control  · Electrical stimulation  is used to send a small amount of electrical energy to your pelvic floor muscles  This helps control your bladder function  Electrodes may be placed outside your body or in your rectum  For women, the electrodes may be placed in the vagina  · A bulking agent  may be injected into the wall of your urethra to make it thicker  This helps keep your urethra closed and decreases urine leakage  · Devices  such as a clamp, pessary, or tampon may help stop urine leaks  Ask your healthcare provider for more information about these and other devices  · Surgery  may be needed if other treatments do not work  Several types of surgery can help improve your bladder control  Ask your healthcare provider for more information about the surgery you may need  How can I manage my symptoms? · Do pelvic muscle exercises often  Your pelvic muscles help you stop urinating  Squeeze these muscles tight for 5 seconds, then relax for 5 seconds  Gradually work up to squeezing for 10 seconds  Do 3 sets of 15 repetitions a day, or as directed  This will help strengthen your pelvic muscles and improve bladder control  · A catheter  may be used to help empty your bladder  A catheter is a tiny, plastic tube that is put into your bladder to drain your urine  Your healthcare provider may tell you to use a catheter to prevent your bladder from getting too full and leaking urine  · Keep a UI record  Write down how often you leak urine and how much you leak  Make a note of what you were doing when you leaked urine  · Train your bladder  Go to the bathroom at set times, such as every 2 hours, even if you do not feel the urge to go  You can also try to hold your urine when you feel the urge to go  For example, hold your urine for 5 minutes when you feel the urge to go  As that becomes easier, hold your urine for 10 minutes  · Drink liquids as directed    Ask your healthcare provider how much liquid to drink each day and which liquids are best for you  You may need to limit the amount of liquid you drink to help control your urine leakage  Limit or do not have drinks that contain caffeine or alcohol  Do not drink any liquid right before you go to bed  · Prevent constipation  Eat a variety of high-fiber foods  Good examples are high-fiber cereals, beans, vegetables, and whole-grain breads  Prune juice may help make your bowel movement softer  Walking is the best way to trigger your intestines to have a bowel movement  · Exercise regularly and maintain a healthy weight  Ask your healthcare provider how much you should weigh and about the best exercise plan for you  Weight loss and exercise will decrease pressure on your bladder and help you control your leakage  Ask him to help you create a weight loss plan if you are overweight  When should I seek immediate care? · You have severe pain  · You are confused or cannot think clearly  When should I contact my healthcare provider? · You have a fever  · You see blood in your urine  · You have pain when you urinate  · You have new or worse pain, even after treatment  · Your mouth feels dry or you have vision changes  · Your urine is cloudy or smells bad  · You have questions or concerns about your condition or care  CARE AGREEMENT:   You have the right to help plan your care  Learn about your health condition and how it may be treated  Discuss treatment options with your caregivers to decide what care you want to receive  You always have the right to refuse treatment  The above information is an  only  It is not intended as medical advice for individual conditions or treatments  Talk to your doctor, nurse or pharmacist before following any medical regimen to see if it is safe and effective for you    © 2017 [employer] Information is for End User's use only and may not be sold, redistributed or otherwise used for commercial purposes  All illustrations and images included in CareNotes® are the copyrighted property of [employer]  or Chester Viveros  Cigarette Smoking and Your Health   AMBULATORY CARE:   Risks to your health if you smoke:  Nicotine and other chemicals found in tobacco damage every cell in your body  Even if you are a light smoker, you have an increased risk for cancer, heart disease, and lung disease  If you are pregnant or have diabetes, smoking increases your risk for complications  Benefits to your health if you stop smoking:   · You decrease respiratory symptoms such as coughing, wheezing, and shortness of breath  · You reduce your risk for cancers of the lung, mouth, throat, kidney, bladder, pancreas, stomach, and cervix  If you already have cancer, you increase the benefits of chemotherapy  You also reduce your risk for cancer returning or a second cancer from developing  · You reduce your risk for heart disease, blood clots, heart attack, and stroke  · You reduce your risk for lung infections, and diseases such as pneumonia, asthma, chronic bronchitis, and emphysema  · Your circulation improves  More oxygen can be delivered to your body  If you have diabetes, you lower your risk for complications, such as kidney, artery, and eye diseases  You also lower your risk for nerve damage  Nerve damage can lead to amputations, poor vision, and blindness  · You improve your body's ability to heal and to fight infections  Benefits to the health of others if you stop smoking:  Tobacco is harmful to nonsmokers who breathe in your secondhand smoke  The following are ways the health of others around you may improve when you stop smoking:  · You lower the risks for lung cancer and heart disease in nonsmoking adults  · If you are pregnant, you lower the risk for miscarriage, early delivery, low birth weight, and stillbirth   You also lower your baby's risk for SIDS, obesity, developmental delay, and neurobehavioral problems, such as ADHD  · If you have children, you lower their risk for ear infections, colds, pneumonia, bronchitis, and asthma  For more information and support to stop smoking:   · Smokefree  gov  Phone: 9- 028 - 560-4734  Web Address: [employer] smokefrBracketr  Follow up with your healthcare provider as directed:  Write down your questions so you remember to ask them during your visits  © 2017 [address] [employer] Information is for End User's use only and may not be sold, redistributed or otherwise used for commercial purposes  All illustrations and images included in CareNotes® are the copyrighted property of [employer] , [employer]  or Chester Jackeline  The above information is an  only  It is not intended as medical advice for individual conditions or treatments  Talk to your doctor, nurse or pharmacist before following any medical regimen to see if it is safe and effective for you  Fall Prevention   WHAT YOU NEED TO KNOW:   What is fall prevention? Fall prevention includes ways to make your home and other areas safer  It also includes ways you can move more carefully to prevent a fall  What increases my risk for falls? · Lack of vitamin D    · Not getting enough sleep each night    · Trouble walking or keeping your balance, or foot problems    · Health conditions that cause changes in your blood pressure, vision, or muscle strength and coordination    · Medicines that make you dizzy, weak, or sleepy    · Problems seeing clearly    · Shoes that have high heels or are not supportive    · Tripping hazards, such as items left on the floor, no handrails on the stairs, or broken steps  How can I help protect myself from falls? · Stand or sit up slowly  This may help you keep your balance and prevent falls   If you need to get up during the night, sit up first  Be sure you are fully awake before you stand  Turn on the light before you start walking  Go slowly in case you are still sleepy  Make sure you will not trip over any pets sleeping in the bedroom  · Use assistive devices as directed  Your healthcare provider may suggest that you use a cane or walker to help you keep your balance  You may need to have grab bars put in your bathroom near the toilet or in the shower  · Wear shoes that fit well and have soles that   Wear shoes both inside and outside  Use slippers with good   Do not wear shoes with high heels  · Wear a personal alarm  This is a device that allows you to call 911 if you fall and need help  Ask your healthcare provider for more information  · Stay active  Exercise can help strengthen your muscles and improve your balance  Your healthcare provider may recommend water aerobics or walking  He or she may also recommend physical therapy to improve your coordination  Never start an exercise program without talking to your healthcare provider first      · Manage medical conditions  Keep all appointments with your healthcare providers  Visit your eye doctor as directed  How can I make my home safer? · Add items to prevent falls in the bathroom  Put nonslip strips on your bath or shower floor to prevent you from slipping  Use a bath mat if you do not have carpet in the bathroom  This will prevent you from falling when you step out of the bath or shower  Use a shower seat so you do not need to stand while you shower  Sit on the toilet or a chair in your bathroom to dry yourself and put on clothing  This will prevent you from losing your balance from drying or dressing yourself while you are standing  · Keep paths clear  Remove books, shoes, and other objects from walkways and stairs  Place cords for telephones and lamps out of the way so that you do not need to walk over them  Tape them down if you cannot move them  Remove small rugs   If you cannot remove a rug, secure it with double-sided tape  This will prevent you from tripping  · Install bright lights in your home  Use night lights to help light paths to the bathroom or kitchen  Always turn on the light before you start walking  · Keep items you use often on shelves within reach  Do not use a step stool to help you reach an item  · Paint or place reflective tape on the edges of your stairs  This will help you see the stairs better  Call 911 or have someone else call if:   · You have fallen and are unconscious  · You have fallen and cannot move part of your body  Contact your healthcare provider if:   · You have fallen and have pain or a headache  · You have questions or concerns about your condition or care  CARE AGREEMENT:   You have the right to help plan your care  Learn about your health condition and how it may be treated  Discuss treatment options with your caregivers to decide what care you want to receive  You always have the right to refuse treatment  The above information is an  only  It is not intended as medical advice for individual conditions or treatments  Talk to your doctor, nurse or pharmacist before following any medical regimen to see if it is safe and effective for you  © 2017 [employer]  Information is for End User's use only and may not be sold, redistributed or otherwise used for commercial purposes  All illustrations and images included in CareNotes® are the copyrighted property of [employer] M [employer]  or Chester Viveros  Advance Directives   WHAT YOU NEED TO KNOW:   What are advance directives? Advance directives are legal documents that state your wishes and plans for medical care  These plans are made ahead of time in case you lose your ability to make decisions for yourself  Advance directives can apply to any medical decision, such as the treatments you want, and if you want to donate organs     What are the types of advance directives? There are many types of advance directives, and each state has rules about how to use them  You may choose a combination of any of the following:  · Living will: This is a written record of the treatment you want  You can also choose which treatments you do not want, which to limit, and which to stop at a certain time  This includes surgery, medicine, IV fluid, and tube feedings  · Durable power of  for healthcare [facility]): This is a written record that states who you want to make healthcare choices for you when you are unable to make them for yourself  This person, called a proxy, is usually a family member or a friend  You may choose more than 1 proxy  · Do not resuscitate (DNR) order:  A DNR order is used in case your heart stops beating or you stop breathing  It is a request not to have certain forms of treatment, such as CPR  A DNR order may be included in other types of advance directives  · Medical directive: This covers the care that you want if you are in a coma, near death, or unable to make decisions for yourself  You can list the treatments you want for each condition  Treatment may include pain medicine, surgery, blood transfusions, dialysis, IV or tube feedings, and a ventilator (breathing machine)  · Values history: This document has questions about your views, beliefs, and how you feel and think about life  This information can help others choose the care that you would choose  Why are advance directives important? An advance directive helps you control your care  Although spoken wishes may be used, it is better to have your wishes written down  Spoken wishes can be misunderstood, or not followed  Treatments may be given even if you do not want them  An advance directive may make it easier for your family to make difficult choices about your care  How do I decide what to put in my advance directives?    · Make informed decisions:  Make sure you fully understand treatments or care you may receive  Think about the benefits and problems your decisions could cause for you or your family  Talk to healthcare providers if you have concerns or questions before you write down your wishes  You may also want to talk with your Druze or , or a   Check your state laws to make sure that what you put in your advance directive is legal      · Sign all forms:  Sign and date your advance directive when you have finished  You may also need 2 witnesses to sign the forms  Witnesses cannot be your doctor or his staff, your spouse, heirs or beneficiaries, people you owe money to, or your chosen proxy  Talk to your family, proxy, and healthcare providers about your advance directive  Give each person a copy, and keep one for yourself in a place you can get to easily  Do not keep it hidden or locked away  · Review and revise your plans: You can revise your advance directive at any time, as long as you are able to make decisions  Review your plan every year, and when there are changes in your life, or your health  When you make changes, let your family, proxy, and healthcare providers know  Give each a new copy  Where can I find more information? · [employer]  [address] [place] , [address]  Phone: 4- 109 - [phone]  Phone: 7- 530 - 754-2627  Web Address: http://www  aafp org  · [address] [facility])  [address] [facility], [address]  Phone: 1- 248 - 871-1970  Phone: [phone]  Web Address: Anjum marie  CARE AGREEMENT:   You have the right to help plan your care  To help with this plan, you must learn about your health condition and treatment options  You must also learn about advance directives and how they are used  Work with your healthcare providers to decide what care will be used to treat you   You always have the right to refuse treatment  The above information is an  only  It is not intended as medical advice for individual conditions or treatments  Talk to your doctor, nurse or pharmacist before following any medical regimen to see if it is safe and effective for you  © 2017 [employer] [employer] Information is for End User's use only and may not be sold, redistributed or otherwise used for commercial purposes  All illustrations and images included in CareNotes® are the copyrighted property of [employer] M , [employer]  or Chester Viveros

## 2019-08-01 NOTE — PROGRESS NOTES
Assessment and Plan:     Problem List Items Addressed This Visit        Musculoskeletal and Integument    Closed fracture of spinous process of cervical vertebra (HCC)    Fracture of one rib of left side       Other    Depression     Reports that he has struggled with this on and off for years  Discussed treatment for depression including medication and counseling  He is not interested in either at this time  Liver lesion     Abdominal US ordered for further workup         Relevant Orders    US abdomen limited    Nicotine dependence     CT chest done in hospital with findings suggestive of COPD  Will perform spirometry at his follow up visit once his rib fracture has healed  Advised on smoking cessation  He is unwilling to cut back at this time             Other Visit Diagnoses     Screening for colorectal cancer    -  Primary    Relevant Orders    Occult Blood, Fecal Immunochemical    Eustachian tube dysfunction, bilateral        Need for hepatitis C screening test        Relevant Orders    Hepatitis C antibody    Screening PSA (prostate specific antigen)        Relevant Orders    PSA, Total Screen    Screening for cardiovascular condition        Relevant Orders    LIPID PANEL + LDL/HDL RATIO    Screening for AAA (abdominal aortic aneurysm)        Relevant Orders    US abdominal aorta screening aaa    Medicare annual wellness visit, initial             History of Present Illness:     Patient presents for Medicare Annual Wellness visit    No care team member to display     Problem List:     Patient Active Problem List   Diagnosis    Closed fracture of spinous process of cervical vertebra (HCC)    Fracture of one rib of left side    Fall    Depression    Hydrocele    Nicotine dependence    Insomnia    Liver lesion      Past Medical and Surgical History:     Past Medical History:   Diagnosis Date    Alcohol dependency ([facility] [address] )     Depression     Nicotine dependence      Past Surgical History: Procedure Laterality Date    HAND SURGERY      Replantation of the hand/fingers     TONSILLECTOMY        Family History:     Family History   Problem Relation Age of Onset    Breast cancer Sister     Breast cancer Family     No Known Problems Mother     No Known Problems Father       Social History:     Social History     Tobacco Use   Smoking Status Current Every Day Smoker    Packs/day: 1 00   Smokeless Tobacco Never Used     Social History     Substance and Sexual Activity   Alcohol Use Yes    Frequency: 4 or more times a week    Drinks per session: 7 to 9    Binge frequency: Daily or almost daily    Comment: daily     Social History     Substance and Sexual Activity   Drug Use No      Medications and Allergies:     Current Outpatient Medications   Medication Sig Dispense Refill    acetaminophen (TYLENOL) 325 mg tablet Take 3 tablets (975 mg total) by mouth every 8 (eight) hours 30 tablet 0    ibuprofen (MOTRIN) 400 mg tablet Take 1 tablet (400 mg total) by mouth every 8 (eight) hours 30 tablet 1    methocarbamol (ROBAXIN) 500 mg tablet Take 1 5 tablets (750 mg total) by mouth every 6 (six) hours 40 tablet 0    oxyCODONE (ROXICODONE) 5 mg immediate release tablet Take 1 tablet (5 mg total) by mouth every 6 (six) hours as needed for moderate painMax Daily Amount: 20 mg 30 tablet 0     No current facility-administered medications for this visit  No Known Allergies   Immunizations: There is no immunization history on file for this patient  Medicare Screening Tests and Risk Assessments:     Marlen Bell is here for his Initial Wellness visit  Health Risk Assessment:  Patient rates overall health as good  Patient feels that their physical health rating is Slightly worse  Eyesight was rated as Slightly worse  Hearing was rated as Slightly worse  Patient feels that their emotional and mental health rating is Same  Pain experienced by patient in the last 7 days has been A lot   Patient's pain rating has been 9/10  Patient states that he has experienced no weight loss or gain in last 6 months  Emotional/Mental Health:  Patient has not been feeling nervous/anxious  PHQ-9 Depression Screening:    Frequency of the following problems over the past two weeks:      1  Little interest or pleasure in doing things: 0 - not at all      2  Feeling down, depressed, or hopeless: 0 - not at all  PHQ-2 Score: 0          Broken Bones/Falls: Fall Risk Assessment:    In the past year, patient has experienced: History of falling in past year    Number of falls: 1    Injured during fall: Yes         Bladder/Bowel:  Patient has not leaked urine accidently in the last six months  Patient reports no loss of bowel control  Immunizations:  Patient has not had a flu vaccination within the last year  Patient has not received a pneumonia shot  Patient has not received a shingles shot  Patient has not received tetanus/diphtheria shot  Home Safety:  Patient does not have trouble with stairs inside or outside of their home  Patient currently reports that there are no safety hazards present in home, working smoke alarms, working carbon monoxide detectors  Preventative Screenings:   no prostate cancer screen performed, no colon cancer screen completed, no cholesterol screen completed, no glaucoma eye exam completed(Additional Comments: Due for eye exam)    Nutrition:  Current diet: Regular with servings of the following:    Medications:  Patient is currently taking over-the-counter supplements  List of OTC medications includes: ibuprofen, tylenol   Patient is able to manage medications  Lifestyle Choices:  Patient reports current tobacco use  Patient reports alcohol use  Alcohol use per week: 2 drinks   Patient drives a vehicle  Patient wears seat belt          Activities of Daily Living:  Can get out of bed by his or her self, able to dress self, able to make own meals, able to do own shopping, able to bathe self, can do own laundry/housekeeping, can manage own money, pay bills and track expenses    Previous Hospitalizations:  Hospitalization or ED visit in past 12 months  Number of hospitalizations within the last year: 1-2        Advanced Directives:  Patient has decided on a power of   Patient has spoken to designated power of   Patient has not completed advanced directive  Preventative Screening/Counseling:      Cardiovascular:      Counseling: Healthy Diet and Healthy Weight     Due for Labs/Analytes/Optional EKG: Lipid Panel          Diabetes:      General: Screening Current      Counseling: Healthy Diet and Healthy Weight          Colorectal Cancer:      General: Risks and Benefits Discussed      Counseling: high fiber diet      Due for studies: Fecal Occult Blood          Prostate Cancer:      General: Risks and Benefits Discussed      Due for labs: PSA          Osteoporosis:      General: Screening Not Indicated          AAA:  Male patient with age over [de-identified] years  Patient has history of tobacco use  Study: Screening US          Glaucoma:      General: Risks and Benefits Discussed      Referrals: Ophthalmology          Hepatitis C:      General: Risks and Benefits Discussed      Counseling: has received general HCV counseling        Advanced Directives:   Patient has no living will for healthcare, does not have durable POA for healthcare, patient does not have an advanced directive  Information on ACP and/or AD not provided  5 wishes given  End of life assessment reviewed with patient       Immunizations:      Pneumococcal: Patient Declines      Shingrix: Patient Declines      TD: Patient Declines

## 2019-08-01 NOTE — PROGRESS NOTES
Assessment/Plan:    Problem List Items Addressed This Visit        Musculoskeletal and Integument    Closed fracture of spinous process of cervical vertebra (HCC)    Fracture of one rib of left side       Other    Depression     Reports that he has struggled with this on and off for years  Discussed treatment for depression including medication and counseling  He is not interested in either at this time  Liver lesion     Abdominal US ordered for further workup         Relevant Orders    US abdomen limited    Nicotine dependence     CT chest done in hospital with findings suggestive of COPD  Will perform spirometry at his follow up visit once his rib fracture has healed  Advised on smoking cessation  He is unwilling to cut back at this time  Other Visit Diagnoses     Screening for colorectal cancer    -  Primary    Relevant Orders    Occult Blood, Fecal Immunochemical    Eustachian tube dysfunction, bilateral        Need for hepatitis C screening test        Relevant Orders    Hepatitis C antibody    Screening PSA (prostate specific antigen)        Relevant Orders    PSA, Total Screen    Screening for cardiovascular condition        Relevant Orders    LIPID PANEL + LDL/HDL RATIO    Screening for AAA (abdominal aortic aneurysm)        Relevant Orders    US abdominal aorta screening aaa          BMI Counseling: Body mass index is 21 62 kg/m²  Patient Instructions     Flonase 2 sprays in each nostril daily  Cortisone cream over the counter to your right ear  Obesity   AMBULATORY CARE:   Obesity  is when your body mass index (BMI) is greater than 30  Your healthcare provider will use your height and weight to measure your BMI  The risks of obesity include  many health problems, such as injuries or physical disability   You may need tests to check for the following:  · Diabetes     · High blood pressure or high cholesterol     · Heart disease     · Gallbladder or liver disease · Cancer of the colon, breast, prostate, liver, or kidney     · Sleep apnea     · Arthritis or gout  Seek care immediately if:   · You have a severe headache, confusion, or difficulty speaking  · You have weakness on one side of your body  · You have chest pain, sweating, or shortness of breath  Contact your healthcare provider if:   · You have symptoms of gallbladder or liver disease, such as pain in your upper abdomen  · You have knee or hip pain and discomfort while walking  · You have symptoms of diabetes, such as intense hunger and thirst, and frequent urination  · You have symptoms of sleep apnea, such as snoring or daytime sleepiness  · You have questions or concerns about your condition or care  Treatment for obesity  focuses on helping you lose weight to improve your health  Even a small decrease in BMI can reduce the risk for many health problems  Your healthcare provider will help you set a weight-loss goal   · Lifestyle changes  are the first step in treating obesity  These include making healthy food choices and getting regular physical activity  Your healthcare provider may suggest a weight-loss program that involves coaching, education, and therapy  · Medicine  may help you lose weight when it is used with a healthy diet and physical activity  · Surgery  can help you lose weight if you are very obese and have other health problems  There are several types of weight-loss surgery  Ask your healthcare provider for more information  Be successful losing weight:   · Set small, realistic goals  An example of a small goal is to walk for 20 minutes 5 days a week  Twin goal is to lose 5% of your body weight  · Tell friends, family members, and coworkers about your goals  and ask for their support  Ask a friend to lose weight with you, or join a weight-loss support group  · Identify foods or triggers that may cause you to overeat , and find ways to avoid them  Remove tempting high-calorie foods from your home and workplace  Place a bowl of fresh fruit on your kitchen counter  If stress causes you to eat, then find other ways to cope with stress  · Keep a diary to track what you eat and drink  Also write down how many minutes of physical activity you do each day  Weigh yourself once a week and record it in your diary  Eating changes: You will need to eat 500 to 1,000 fewer calories each day than you currently eat to lose 1 to 2 pounds a week  The following changes will help you cut calories:  · Eat smaller portions  Use small plates, no larger than 9 inches in diameter  Fill your plate half full of fruits and vegetables  Measure your food using measuring cups until you know what a serving size looks like  · Eat 3 meals and 1 or 2 snacks each day  Plan your meals in advance  [employer] and eat at home most of the time  Eat slowly  · Eat fruits and vegetables at every meal   They are low in calories and high in fiber, which makes you feel full  Do not add butter, margarine, or cream sauce to vegetables  Use herbs to season steamed vegetables  · Eat less fat and fewer fried foods  Eat more baked or grilled chicken and fish  These protein sources are lower in calories and fat than red meat  Limit fast food  Dress your salads with olive oil and vinegar instead of bottled dressing  · Limit the amount of sugar you eat  Do not drink sugary beverages  Limit alcohol  Activity changes:  Physical activity is good for your body in many ways  It helps you burn calories and build strong muscles  It decreases stress and depression, and improves your mood  It can also help you sleep better  Talk to your healthcare provider before you begin an exercise program   · Exercise for at least 30 minutes 5 days a week  Start slowly  Set aside time each day for physical activity that you enjoy and that is convenient for you   It is best to do both weight training and an activity that increases your heart rate, such as walking, bicycling, or swimming  · Find ways to be more active  Do yard work and housecleaning  Walk up the stairs instead of using elevators  Spend your leisure time going to events that require walking, such as outdoor festivals or fairs  This extra physical activity can help you lose weight and keep it off  Follow up with your healthcare provider as directed: You may need to meet with a dietitian  Write down your questions so you remember to ask them during your visits  © 2017 [employer]  Information is for End User's use only and may not be sold, redistributed or otherwise used for commercial purposes  All illustrations and images included in CareNotes® are the copyrighted property of [employer]  or [employer] Viveros  The above information is an  only  It is not intended as medical advice for individual conditions or treatments  Talk to your doctor, nurse or pharmacist before following any medical regimen to see if it is safe and effective for you  Urinary Incontinence   WHAT YOU NEED TO KNOW:   What is urinary incontinence? Urinary incontinence (UI) is when you lose control of your bladder  What causes UI? UI occurs because your bladder cannot store or empty urine properly  The following are the most common types of UI:  · Stress incontinence  is when you leak urine due to increased bladder pressure  This may happen when you cough, sneeze, or exercise  · Urge incontinence  is when you feel the need to urinate right away and leak urine accidentally  · Mixed incontinence  is when you have both stress and urge UI  What are the signs and symptoms of UI?   · You feel like your bladder does not empty completely when you urinate  · You urinate often and need to urinate immediately  · You leak urine when you sleep, or you wake up with the urge to urinate      · You leak urine when you cough, sneeze, exercise, or laugh  How is UI diagnosed? Your healthcare provider will ask how often you leak urine and whether you have stress or urge symptoms  Tell him which medicines you take, how often you urinate, and how much liquid you drink each day  You may need any of the following tests:  · Urine tests  may show infection or kidney function  · A pelvic exam  may be done to check for blockages  A pelvic exam will also show if your bladder, uterus, or other organs have moved out of place  · An x-ray, ultrasound, or CT  may show problems with parts of your urinary system  You may be given contrast liquid to help your organs show up better in the pictures  Tell the healthcare provider if you have ever had an allergic reaction to contrast liquid  Do not enter the MRI room with anything metal  Metal can cause serious injury  Tell the healthcare provider if you have any metal in or on your body  · A bladder scan  will show how much urine is left in your bladder after you urinate  You will be asked to urinate and then healthcare providers will use a small ultrasound machine to check the urine left in your bladder  · Cystometry  is used to check the function of your urinary system  Your healthcare provider checks the pressure in your bladder while filling it with fluid  Your bladder pressure may also be tested when your bladder is full and while you urinate  How is UI treated? · Medicines  can help strengthen your bladder control  · Electrical stimulation  is used to send a small amount of electrical energy to your pelvic floor muscles  This helps control your bladder function  Electrodes may be placed outside your body or in your rectum  For women, the electrodes may be placed in the vagina  · A bulking agent  may be injected into the wall of your urethra to make it thicker  This helps keep your urethra closed and decreases urine leakage       · Devices  such as a clamp, pessary, or tampon may help stop urine leaks  Ask your healthcare provider for more information about these and other devices  · Surgery  may be needed if other treatments do not work  Several types of surgery can help improve your bladder control  Ask your healthcare provider for more information about the surgery you may need  How can I manage my symptoms? · Do pelvic muscle exercises often  Your pelvic muscles help you stop urinating  Squeeze these muscles tight for 5 seconds, then relax for 5 seconds  Gradually work up to squeezing for 10 seconds  Do 3 sets of 15 repetitions a day, or as directed  This will help strengthen your pelvic muscles and improve bladder control  · A catheter  may be used to help empty your bladder  A catheter is a tiny, plastic tube that is put into your bladder to drain your urine  Your healthcare provider may tell you to use a catheter to prevent your bladder from getting too full and leaking urine  · Keep a UI record  Write down how often you leak urine and how much you leak  Make a note of what you were doing when you leaked urine  · Train your bladder  Go to the bathroom at set times, such as every 2 hours, even if you do not feel the urge to go  You can also try to hold your urine when you feel the urge to go  For example, hold your urine for 5 minutes when you feel the urge to go  As that becomes easier, hold your urine for 10 minutes  · Drink liquids as directed  Ask your healthcare provider how much liquid to drink each day and which liquids are best for you  You may need to limit the amount of liquid you drink to help control your urine leakage  Limit or do not have drinks that contain caffeine or alcohol  Do not drink any liquid right before you go to bed  · Prevent constipation  Eat a variety of high-fiber foods  Good examples are high-fiber cereals, beans, vegetables, and whole-grain breads  Prune juice may help make your bowel movement softer   Walking is the best way to trigger your intestines to have a bowel movement  · Exercise regularly and maintain a healthy weight  Ask your healthcare provider how much you should weigh and about the best exercise plan for you  Weight loss and exercise will decrease pressure on your bladder and help you control your leakage  Ask him to help you create a weight loss plan if you are overweight  When should I seek immediate care? · You have severe pain  · You are confused or cannot think clearly  When should I contact my healthcare provider? · You have a fever  · You see blood in your urine  · You have pain when you urinate  · You have new or worse pain, even after treatment  · Your mouth feels dry or you have vision changes  · Your urine is cloudy or smells bad  · You have questions or concerns about your condition or care  CARE AGREEMENT:   You have the right to help plan your care  Learn about your health condition and how it may be treated  Discuss treatment options with your caregivers to decide what care you want to receive  You always have the right to refuse treatment  The above information is an  only  It is not intended as medical advice for individual conditions or treatments  Talk to your doctor, nurse or pharmacist before following any medical regimen to see if it is safe and effective for you  © 2017 [employer]  Information is for End User's use only and may not be sold, redistributed or otherwise used for commercial purposes  All illustrations and images included in CareNotes® are the copyrighted property of [employer] M , Inc  or Chester Viveros  Cigarette Smoking and Your Health   AMBULATORY CARE:   Risks to your health if you smoke:  Nicotine and other chemicals found in tobacco damage every cell in your body  Even if you are a light smoker, you have an increased risk for cancer, heart disease, and lung disease   If you are pregnant or have diabetes, smoking increases your risk for complications  Benefits to your health if you stop smoking:   · You decrease respiratory symptoms such as coughing, wheezing, and shortness of breath  · You reduce your risk for cancers of the lung, mouth, throat, kidney, bladder, pancreas, stomach, and cervix  If you already have cancer, you increase the benefits of chemotherapy  You also reduce your risk for cancer returning or a second cancer from developing  · You reduce your risk for heart disease, blood clots, heart attack, and stroke  · You reduce your risk for lung infections, and diseases such as pneumonia, asthma, chronic bronchitis, and emphysema  · Your circulation improves  More oxygen can be delivered to your body  If you have diabetes, you lower your risk for complications, such as kidney, artery, and eye diseases  You also lower your risk for nerve damage  Nerve damage can lead to amputations, poor vision, and blindness  · You improve your body's ability to heal and to fight infections  Benefits to the health of others if you stop smoking:  Tobacco is harmful to nonsmokers who breathe in your secondhand smoke  The following are ways the health of others around you may improve when you stop smoking:  · You lower the risks for lung cancer and heart disease in nonsmoking adults  · If you are pregnant, you lower the risk for miscarriage, early delivery, low birth weight, and stillbirth  You also lower your baby's risk for SIDS, obesity, developmental delay, and neurobehavioral problems, such as ADHD  · If you have children, you lower their risk for ear infections, colds, pneumonia, bronchitis, and asthma  For more information and support to stop smoking:   · Smokefree  gov  Phone: 7- 261 - 683-3169  Web Address: www smokefrCuculus  Follow up with your healthcare provider as directed:  Write down your questions so you remember to ask them during your visits     © 2017 [employer] Information is for End User's use only and may not be sold, redistributed or otherwise used for commercial purposes  All illustrations and images included in CareNotes® are the copyrighted property of [employer]  or Chester Viveros  The above information is an  only  It is not intended as medical advice for individual conditions or treatments  Talk to your doctor, nurse or pharmacist before following any medical regimen to see if it is safe and effective for you  Fall Prevention   WHAT YOU NEED TO KNOW:   What is fall prevention? Fall prevention includes ways to make your home and other areas safer  It also includes ways you can move more carefully to prevent a fall  What increases my risk for falls? · Lack of vitamin D    · Not getting enough sleep each night    · Trouble walking or keeping your balance, or foot problems    · Health conditions that cause changes in your blood pressure, vision, or muscle strength and coordination    · Medicines that make you dizzy, weak, or sleepy    · Problems seeing clearly    · Shoes that have high heels or are not supportive    · Tripping hazards, such as items left on the floor, no handrails on the stairs, or broken steps  How can I help protect myself from falls? · Stand or sit up slowly  This may help you keep your balance and prevent falls  If you need to get up during the night, sit up first  Be sure you are fully awake before you stand  Turn on the light before you start walking  Go slowly in case you are still sleepy  Make sure you will not trip over any pets sleeping in the bedroom  · Use assistive devices as directed  Your healthcare provider may suggest that you use a cane or walker to help you keep your balance  You may need to have grab bars put in your bathroom near the toilet or in the shower  · Wear shoes that fit well and have soles that   Wear shoes both inside and outside  Use slippers with good    Do not wear shoes with high heels  · Wear a personal alarm  This is a device that allows you to call 911 if you fall and need help  Ask your healthcare provider for more information  · Stay active  Exercise can help strengthen your muscles and improve your balance  Your healthcare provider may recommend water aerobics or walking  He or she may also recommend physical therapy to improve your coordination  Never start an exercise program without talking to your healthcare provider first      · Manage medical conditions  Keep all appointments with your healthcare providers  Visit your eye doctor as directed  How can I make my home safer? · Add items to prevent falls in the bathroom  Put nonslip strips on your bath or shower floor to prevent you from slipping  Use a bath mat if you do not have carpet in the bathroom  This will prevent you from falling when you step out of the bath or shower  Use a shower seat so you do not need to stand while you shower  Sit on the toilet or a chair in your bathroom to dry yourself and put on clothing  This will prevent you from losing your balance from drying or dressing yourself while you are standing  · Keep paths clear  Remove books, shoes, and other objects from walkways and stairs  Place cords for telephones and lamps out of the way so that you do not need to walk over them  Tape them down if you cannot move them  Remove small rugs  If you cannot remove a rug, secure it with double-sided tape  This will prevent you from tripping  · Install bright lights in your home  Use night lights to help light paths to the bathroom or kitchen  Always turn on the light before you start walking  · Keep items you use often on shelves within reach  Do not use a step stool to help you reach an item  · Paint or place reflective tape on the edges of your stairs  This will help you see the stairs better    Call 911 or have someone else call if:   · You have fallen and are unconscious  · You have fallen and cannot move part of your body  Contact your healthcare provider if:   · You have fallen and have pain or a headache  · You have questions or concerns about your condition or care  CARE AGREEMENT:   You have the right to help plan your care  Learn about your health condition and how it may be treated  Discuss treatment options with your caregivers to decide what care you want to receive  You always have the right to refuse treatment  The above information is an  only  It is not intended as medical advice for individual conditions or treatments  Talk to your doctor, nurse or pharmacist before following any medical regimen to see if it is safe and effective for you  © 2017 [employer]  Information is for End User's use only and may not be sold, redistributed or otherwise used for commercial purposes  All illustrations and images included in CareNotes® are the copyrighted property of [employer]  or Chester Viveros  Advance Directives   WHAT YOU NEED TO KNOW:   What are advance directives? Advance directives are legal documents that state your wishes and plans for medical care  These plans are made ahead of time in case you lose your ability to make decisions for yourself  Advance directives can apply to any medical decision, such as the treatments you want, and if you want to donate organs  What are the types of advance directives? There are many types of advance directives, and each state has rules about how to use them  You may choose a combination of any of the following:  · Living will: This is a written record of the treatment you want  You can also choose which treatments you do not want, which to limit, and which to stop at a certain time  This includes surgery, medicine, IV fluid, and tube feedings  · Durable power of  for healthcare [facility]):   This is a written record that states who you want to make healthcare choices for you when you are unable to make them for yourself  This person, called a proxy, is usually a family member or a friend  You may choose more than 1 proxy  · Do not resuscitate (DNR) order:  A DNR order is used in case your heart stops beating or you stop breathing  It is a request not to have certain forms of treatment, such as CPR  A DNR order may be included in other types of advance directives  · Medical directive: This covers the care that you want if you are in a coma, near death, or unable to make decisions for yourself  You can list the treatments you want for each condition  Treatment may include pain medicine, surgery, blood transfusions, dialysis, IV or tube feedings, and a ventilator (breathing machine)  · Values history: This document has questions about your views, beliefs, and how you feel and think about life  This information can help others choose the care that you would choose  Why are advance directives important? An advance directive helps you control your care  Although spoken wishes may be used, it is better to have your wishes written down  Spoken wishes can be misunderstood, or not followed  Treatments may be given even if you do not want them  An advance directive may make it easier for your family to make difficult choices about your care  How do I decide what to put in my advance directives? · Make informed decisions:  Make sure you fully understand treatments or care you may receive  Think about the benefits and problems your decisions could cause for you or your family  Talk to healthcare providers if you have concerns or questions before you write down your wishes  You may also want to talk with your Buddhist or , or a   Check your state laws to make sure that what you put in your advance directive is legal      · Sign all forms:  Sign and date your advance directive when you have finished   You may also need 2 witnesses to sign the forms  Witnesses cannot be your doctor or his staff, your spouse, heirs or beneficiaries, people you owe money to, or your chosen proxy  Talk to your family, proxy, and healthcare providers about your advance directive  Give each person a copy, and keep one for yourself in a place you can get to easily  Do not keep it hidden or locked away  · Review and revise your plans: You can revise your advance directive at any time, as long as you are able to make decisions  Review your plan every year, and when there are changes in your life, or your health  When you make changes, let your family, proxy, and healthcare providers know  Give each a new copy  Where can I find more information? · [employer]  [address] , [place] [address]  Phone: 2- 446 - 898-1542  Phone: 0- 744 - [phone]  Web Address: http://www  aafp org  · [address] [facility])  [address] [address] [facility], [address]  Phone: 5- 520 - [phone]  Phone: [phone]  Web Address: [place] frank  CARE AGREEMENT:   You have the right to help plan your care  To help with this plan, you must learn about your health condition and treatment options  You must also learn about advance directives and how they are used  Work with your healthcare providers to decide what care will be used to treat you  You always have the right to refuse treatment  The above information is an  only  It is not intended as medical advice for individual conditions or treatments  Talk to your doctor, nurse or pharmacist before following any medical regimen to see if it is safe and effective for you  © 2017 [employer] Information is for End User's use only and may not be sold, redistributed or otherwise used for commercial purposes   All illustrations and images included in CareNotes® are the copyrighted property of [employer]  or [employer]  Return in about 6 months (around 2/1/2020)  Subjective:      Patient ID: Marlon Harvey is a 80 y o  male  Chief Complaint   Patient presents with   [address] [place] [address] Wellness Visit       Here today to establish care  He recently fell and sustained a rib and cervical spine fracture  He has been doing well since his discharge  He had an incidental finding of a liver lesion requiring additional workup  History of ETOH and tobacco abuse  Was drinking 2 pints of rum per day  Since his fall, he has cut back to 2 drinks of rum per day  Has appt with trauma surgery for follow up  No prior health issues  Has not had routine medical care in many years      The following portions of the patient's history were reviewed and updated as appropriate: allergies, current medications, past family history, past medical history, past social history, past surgical history and problem list     Review of Systems   Constitutional: Negative for chills, fatigue and fever  Respiratory: Negative for cough, shortness of breath and wheezing  Cardiovascular: Negative for chest pain, palpitations and leg swelling  Gastrointestinal: Negative for abdominal pain, diarrhea, nausea and vomiting  Musculoskeletal:        See HPI   Skin: Negative for rash  Neurological: Negative for dizziness and headaches           Current Outpatient Medications   Medication Sig Dispense Refill    acetaminophen (TYLENOL) 325 mg tablet Take 3 tablets (975 mg total) by mouth every 8 (eight) hours 30 tablet 0    ibuprofen (MOTRIN) 400 mg tablet Take 1 tablet (400 mg total) by mouth every 8 (eight) hours 30 tablet 1    methocarbamol (ROBAXIN) 500 mg tablet Take 1 5 tablets (750 mg total) by mouth every 6 (six) hours 40 tablet 0    oxyCODONE (ROXICODONE) 5 mg immediate release tablet Take 1 tablet (5 mg total) by mouth every 6 (six) hours as needed for moderate painMax Daily Amount: 20 mg 30 tablet 0 No current facility-administered medications for this visit  Objective:    /62   Pulse 100   Temp 99 1 °F (37 3 °C)   Resp 18   Ht 5' 11" (1 803 m)   Wt 70 3 kg (155 lb)   BMI 21 62 kg/m²        Physical Exam   Constitutional: He appears well-developed and well-nourished  HENT:   Head: Normocephalic and atraumatic  Right Ear: Tympanic membrane, external ear and ear canal normal    Left Ear: Tympanic membrane, external ear and ear canal normal    Nose: No mucosal edema or rhinorrhea  Mouth/Throat: Uvula is midline, oropharynx is clear and moist and mucous membranes are normal    Eyes: Conjunctivae are normal    Neck: Neck supple  No edema present  No thyromegaly present  Cardiovascular: Normal rate, regular rhythm, normal heart sounds and intact distal pulses  No murmur heard  Pulmonary/Chest: Effort normal and breath sounds normal    Abdominal: Bowel sounds are normal  He exhibits no distension  There is no splenomegaly or hepatomegaly  There is no tenderness  Lymphadenopathy:        Right cervical: No superficial cervical adenopathy present  Left cervical: No superficial cervical adenopathy present  Skin: Skin is warm, dry and intact  No rash noted  Psychiatric: He has a normal mood and affect  Nursing note and vitals reviewed               Adelina Crespo

## 2019-08-02 NOTE — ASSESSMENT & PLAN NOTE
Reports that he has struggled with this on and off for years  Discussed treatment for depression including medication and counseling  He is not interested in either at this time

## 2019-08-02 NOTE — ASSESSMENT & PLAN NOTE
CT chest done in hospital with findings suggestive of COPD  Will perform spirometry at his follow up visit once his rib fracture has healed  Advised on smoking cessation  He is unwilling to cut back at this time

## 2019-08-20 ENCOUNTER — OFFICE VISIT (OUTPATIENT)
Dept: SURGERY | Facility: CLINIC | Age: 69
End: 2019-08-20
Payer: MEDICARE

## 2019-08-20 VITALS
HEART RATE: 92 BPM | BODY MASS INDEX: 21.7 KG/M2 | WEIGHT: 155 LBS | HEIGHT: 71 IN | SYSTOLIC BLOOD PRESSURE: 126 MMHG | DIASTOLIC BLOOD PRESSURE: 86 MMHG | TEMPERATURE: 98 F

## 2019-08-20 DIAGNOSIS — S22.32XA: Primary | ICD-10-CM

## 2019-08-20 PROCEDURE — 99213 OFFICE O/P EST LOW 20 MIN: CPT | Performed by: PHYSICIAN ASSISTANT

## 2019-08-20 PROCEDURE — 1124F ACP DISCUSS-NO DSCNMKR DOCD: CPT | Performed by: PHYSICIAN ASSISTANT

## 2019-08-20 NOTE — PROGRESS NOTES
Office Visit - trauma  Janeth Paula MRN: [id number]  Encounter: [id number]    Assessment and Plan    Problem List Items Addressed This Visit        Musculoskeletal and Integument    Fracture of one rib of left side - Primary     -no further follow-up at this time  -patient should follow up with his primary care provider moving forward  -no repeat chest x-ray  -told to call with any questions or concerns  -saturation is 95% on room air  -continue p r n  Pain control without narcotics             Disposition:  No further follow-up  DC from Trauma service  Chief Complaint:  Janeth Palua is a 77 y o  male who presents for Rib Injury (2week f/u rib fx)    Subjective  Patient is doing well  Reports no new complaints  Denies any new chest pain or shortness of breath  No nausea or vomiting  No cough or congestion  No fevers, chills, sweats      Past Medical History  Past Medical History:   Diagnosis Date    Alcohol dependency ([facility] [address] )     Depression     Nicotine dependence        Past Surgical History  Past Surgical History:   Procedure Laterality Date    HAND SURGERY      Replantation of the hand/fingers     TONSILLECTOMY         Family History  Family History   Problem Relation Age of Onset    Breast cancer Sister     Breast cancer Family     No Known Problems Mother     No Known Problems Father        Social History  Social History     Socioeconomic History    Marital status: /Civil Union     Spouse name: None    Number of children: None    Years of education: None    Highest education level: None   Occupational History    None   Social Needs    Financial resource strain: None    Food insecurity:     Worry: None     Inability: None    Transportation needs:     Medical: None     Non-medical: None   Tobacco Use    Smoking status: Current Every Day Smoker     Packs/day: 1 00    Smokeless tobacco: Never Used   Substance and Sexual Activity    Alcohol use: Yes     Frequency: 4 or more times a week     Drinks per session: 7 to 9     Binge frequency: Daily or almost daily     Comment: daily    Drug use: No    Sexual activity: None   Lifestyle    Physical activity:     Days per week: None     Minutes per session: None    Stress: None   Relationships    Social connections:     Talks on phone: None     Gets together: None     Attends Jainism service: None     Active member of club or organization: None     Attends meetings of clubs or organizations: None     Relationship status: None    Intimate partner violence:     Fear of current or ex partner: None     Emotionally abused: None     Physically abused: None     Forced sexual activity: None   Other Topics Concern    None   Social History Narrative    Occupation: Autobody - As [employer]         Medications  Current Outpatient Medications on File Prior to Visit   Medication Sig Dispense Refill    acetaminophen (TYLENOL) 325 mg tablet Take 3 tablets (975 mg total) by mouth every 8 (eight) hours 30 tablet 0    ibuprofen (MOTRIN) 400 mg tablet Take 1 tablet (400 mg total) by mouth every 8 (eight) hours 30 tablet 1    methocarbamol (ROBAXIN) 500 mg tablet Take 1 5 tablets (750 mg total) by mouth every 6 (six) hours 40 tablet 0    oxyCODONE (ROXICODONE) 5 mg immediate release tablet Take 1 tablet (5 mg total) by mouth every 6 (six) hours as needed for moderate painMax Daily Amount: 20 mg 30 tablet 0     No current facility-administered medications on file prior to visit  Allergies  No Known Allergies    Review of Systems   Constitutional: Negative for activity change, appetite change and fever  HENT: Negative for ear discharge, ear pain, rhinorrhea, sore throat and trouble swallowing  Eyes: Negative for photophobia, pain and redness  Respiratory: Negative for apnea, cough, chest tightness, shortness of breath and stridor  Cardiovascular: Negative for chest pain and palpitations     Gastrointestinal: Negative for abdominal distention, abdominal pain, nausea and vomiting  Endocrine: Negative for cold intolerance and heat intolerance  Genitourinary: Negative  Musculoskeletal: Negative for arthralgias, back pain, neck pain and neck stiffness  Skin: Negative  Neurological: Negative for dizziness, weakness, light-headedness and numbness  Hematological: Negative  Objective  Vitals:    08/20/19 1258   BP: 126/86   Pulse: 92   Temp: 98 °F (36 7 °C)       Physical Exam   Constitutional: He is oriented to person, place, and time  He appears well-developed and well-nourished  HENT:   Head: Normocephalic and atraumatic  Eyes: Pupils are equal, round, and reactive to light  EOM are normal    Neck: Normal range of motion  Neck supple  Cardiovascular: Normal rate, regular rhythm, normal heart sounds and intact distal pulses  Pulmonary/Chest: Effort normal and breath sounds normal  No stridor  No respiratory distress  He has no wheezes  He has no rales  Abdominal: Soft  Bowel sounds are normal  He exhibits no distension  There is no tenderness  Musculoskeletal: Normal range of motion  He exhibits no edema or deformity  Neurological: He is alert and oriented to person, place, and time  No cranial nerve deficit  Skin: Skin is warm and dry  Vitals reviewed

## 2019-08-21 ENCOUNTER — TRANSCRIBE ORDERS (OUTPATIENT)
Dept: ADMINISTRATIVE | Facility: HOSPITAL | Age: 69
End: 2019-08-21

## 2019-08-21 ENCOUNTER — APPOINTMENT (OUTPATIENT)
Dept: LAB | Facility: HOSPITAL | Age: 69
End: 2019-08-21
Payer: MEDICARE

## 2019-08-21 DIAGNOSIS — Z12.11 SCREENING FOR COLORECTAL CANCER: ICD-10-CM

## 2019-08-21 DIAGNOSIS — Z12.12 SCREENING FOR COLORECTAL CANCER: ICD-10-CM

## 2019-08-21 LAB — HEMOCCULT STL QL IA: NEGATIVE

## 2019-08-21 PROCEDURE — G0328 FECAL BLOOD SCRN IMMUNOASSAY: HCPCS

## 2019-08-26 NOTE — ASSESSMENT & PLAN NOTE
-no further follow-up at this time  -patient should follow up with his primary care provider moving forward  -no repeat chest x-ray  -told to call with any questions or concerns  -saturation is 95% on room air  -continue p r n   Pain control without narcotics

## 2020-02-03 ENCOUNTER — OFFICE VISIT (OUTPATIENT)
Dept: FAMILY MEDICINE CLINIC | Facility: CLINIC | Age: 70
End: 2020-02-03
Payer: MEDICARE

## 2020-02-03 VITALS
RESPIRATION RATE: 16 BRPM | HEIGHT: 71 IN | SYSTOLIC BLOOD PRESSURE: 140 MMHG | BODY MASS INDEX: 23.66 KG/M2 | HEART RATE: 88 BPM | WEIGHT: 169 LBS | DIASTOLIC BLOOD PRESSURE: 76 MMHG | TEMPERATURE: 99.3 F

## 2020-02-03 DIAGNOSIS — F17.218 CIGARETTE NICOTINE DEPENDENCE WITH OTHER NICOTINE-INDUCED DISORDER: Primary | ICD-10-CM

## 2020-02-03 DIAGNOSIS — K76.9 LIVER LESION: ICD-10-CM

## 2020-02-03 DIAGNOSIS — R05.3 CHRONIC COUGH: ICD-10-CM

## 2020-02-03 DIAGNOSIS — L30.9 ECZEMA, UNSPECIFIED TYPE: ICD-10-CM

## 2020-02-03 DIAGNOSIS — J31.0 CHRONIC RHINITIS: ICD-10-CM

## 2020-02-03 DIAGNOSIS — F32.89 OTHER DEPRESSION: ICD-10-CM
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
## 2020-02-03 PROBLEM — S12.9XXA CLOSED FRACTURE OF SPINOUS PROCESS OF CERVICAL VERTEBRA (HCC): Status: RESOLVED | Noted: 2019-07-15 | Resolved: 2020-02-03

## 2020-02-03 PROBLEM — S22.32XA FRACTURE OF ONE RIB OF LEFT SIDE: Status: RESOLVED | Noted: 2019-07-15 | Resolved: 2020-02-03

## 2020-02-03 PROCEDURE — 3008F BODY MASS INDEX DOCD: CPT | Performed by: NURSE PRACTITIONER

## 2020-02-03 PROCEDURE — 99214 OFFICE O/P EST MOD 30 MIN: CPT | Performed by: NURSE PRACTITIONER

## 2020-02-03 PROCEDURE — 1160F RVW MEDS BY RX/DR IN RCRD: CPT | Performed by: NURSE PRACTITIONER

## 2020-02-03 RX ORDER — FLUTICASONE PROPIONATE 50 MCG
2 SPRAY, SUSPENSION (ML) NASAL DAILY
Qty: 16 G | Refills: 3 | Status: SHIPPED | OUTPATIENT
Start: 2020-02-03 | End: 2021-01-01

## 2020-02-03 RX ORDER — TRIAMCINOLONE ACETONIDE 5 MG/G
CREAM TOPICAL 2 TIMES DAILY
Qty: 30 G | Refills: 0 | Status: SHIPPED | OUTPATIENT
Start: 2020-02-03 | End: 2021-01-01

## 2020-02-03 NOTE — PROGRESS NOTES
Assessment/Plan:    Will check spirometry at his f/u visit in 6 months  1  Cigarette nicotine dependence with other nicotine-induced disorder    2  Liver lesion  Assessment & Plan:  He has not yet scheduled US  He was provided with the order      3  Chronic cough    4  Chronic rhinitis  Assessment & Plan: Will start on Flonase daily    Orders:  -     fluticasone (FLONASE) 50 mcg/act nasal spray; 2 sprays into each nostril daily    5  Eczema, unspecified type  -     triamcinolone (KENALOG) 0 5 % cream; Apply topically 2 (two) times a day    6  Other depression  Assessment & Plan:  His wife has colon cancer  He feels he is coping appropriately  Offered support            There are no Patient Instructions on file for this visit  Return in about 6 months (around 8/3/2020) for Medicare Wellness Visit  Subjective:      Patient ID: Yahaira Schwarz is a 77 y o  male  Chief Complaint   Patient presents with    Follow-up     med use--lj       Here today for 6 month follow up  He has not yet had his labs or liver US done  He has some flaky skin in his right ear and and has a clear fluid oozing from it  He denies any shortness of breath, but does complain of a chronic cough  He attributes this to work exposure  He has chronic sinus symptoms as well  Has been taking milk thistle to cleanse his liver  Smoking 1 ppd   He rolls his own cigarettes  No other complaints or concerns today      The following portions of the patient's history were reviewed and updated as appropriate: allergies, current medications, past family history, past medical history, past social history, past surgical history and problem list     Review of Systems   Constitutional: Negative for chills, fatigue and fever  HENT: Positive for congestion, ear discharge and rhinorrhea  Respiratory: Positive for cough  Negative for shortness of breath and wheezing      Cardiovascular: Negative for chest pain, palpitations and leg swelling  Gastrointestinal: Negative for abdominal pain, diarrhea, nausea and vomiting  Skin: Negative for rash  Neurological: Negative for dizziness and headaches  Current Outpatient Medications   Medication Sig Dispense Refill    fluticasone (FLONASE) 50 mcg/act nasal spray 2 sprays into each nostril daily 16 g 3    triamcinolone (KENALOG) 0 5 % cream Apply topically 2 (two) times a day 30 g 0     No current facility-administered medications for this visit  Objective:    /76   Pulse 88   Temp 99 3 °F (37 4 °C)   Resp 16   Ht 5' 11" (1 803 m)   Wt 76 7 kg (169 lb)   BMI 23 57 kg/m²        Physical Exam   Constitutional: He appears well-developed and well-nourished  HENT:   Head: Normocephalic and atraumatic  Right Ear: Tympanic membrane, external ear and ear canal normal    Left Ear: Tympanic membrane, external ear and ear canal normal    Ears:    Nose: No mucosal edema or rhinorrhea  Mouth/Throat: Uvula is midline, oropharynx is clear and moist and mucous membranes are normal    Eyes: Conjunctivae are normal    Neck: Neck supple  No edema present  No thyromegaly present  Cardiovascular: Normal rate, regular rhythm, normal heart sounds and intact distal pulses  No murmur heard  Pulmonary/Chest: Effort normal and breath sounds normal    Abdominal: Bowel sounds are normal  He exhibits no distension  There is no splenomegaly or hepatomegaly  There is no tenderness  Lymphadenopathy:        Right cervical: No superficial cervical adenopathy present  Left cervical: No superficial cervical adenopathy present  Skin: Skin is warm, dry and intact  No rash noted  Psychiatric: He has a normal mood and affect  Nursing note and vitals reviewed               Zachary Ro

## 2021-01-01 ENCOUNTER — TELEPHONE (OUTPATIENT)
Dept: FAMILY MEDICINE CLINIC | Facility: CLINIC | Age: 71
End: 2021-01-01

## 2021-01-01 ENCOUNTER — APPOINTMENT (OUTPATIENT)
Dept: LAB | Facility: HOSPITAL | Age: 71
End: 2021-01-01
Payer: MEDICARE

## 2021-01-01 ENCOUNTER — OFFICE VISIT (OUTPATIENT)
Dept: FAMILY MEDICINE CLINIC | Facility: CLINIC | Age: 71
End: 2021-01-01
Payer: MEDICARE

## 2021-01-01 VITALS
HEART RATE: 96 BPM | RESPIRATION RATE: 18 BRPM | WEIGHT: 166.4 LBS | HEIGHT: 71 IN | BODY MASS INDEX: 23.3 KG/M2 | OXYGEN SATURATION: 95 % | SYSTOLIC BLOOD PRESSURE: 120 MMHG | DIASTOLIC BLOOD PRESSURE: 68 MMHG | TEMPERATURE: 98.5 F

## 2021-01-01 DIAGNOSIS — Z11.59 NEED FOR HEPATITIS C SCREENING TEST: ICD-10-CM

## 2021-01-01 DIAGNOSIS — Z13.6 SCREENING FOR CARDIOVASCULAR CONDITION: ICD-10-CM

## 2021-01-01 DIAGNOSIS — Z12.5 SCREENING PSA (PROSTATE SPECIFIC ANTIGEN): ICD-10-CM

## 2021-01-01 DIAGNOSIS — Z13.29 SCREENING FOR THYROID DISORDER: ICD-10-CM

## 2021-01-01 DIAGNOSIS — F10.10 ALCOHOL ABUSE: ICD-10-CM

## 2021-01-01 DIAGNOSIS — B37.0 ORAL THRUSH: ICD-10-CM

## 2021-01-01 DIAGNOSIS — Z12.11 SCREENING FOR COLON CANCER: ICD-10-CM

## 2021-01-01 DIAGNOSIS — K76.9 LIVER LESION: ICD-10-CM

## 2021-01-01 DIAGNOSIS — F33.0 MAJOR DEPRESSIVE DISORDER, RECURRENT, MILD (HCC): ICD-10-CM

## 2021-01-01 DIAGNOSIS — F17.218 CIGARETTE NICOTINE DEPENDENCE WITH OTHER NICOTINE-INDUCED DISORDER: ICD-10-CM

## 2021-01-01 DIAGNOSIS — Z00.00 ENCOUNTER FOR SUBSEQUENT ANNUAL WELLNESS VISIT (AWV) IN MEDICARE PATIENT: Primary | ICD-10-CM

## 2021-01-01 LAB
ALBUMIN SERPL BCP-MCNC: 3.5 G/DL (ref 3.5–5)
ALP SERPL-CCNC: 76 U/L (ref 46–116)
ALT SERPL W P-5'-P-CCNC: 27 U/L (ref 12–78)
ANION GAP SERPL CALCULATED.3IONS-SCNC: 9 MMOL/L (ref 4–13)
AST SERPL W P-5'-P-CCNC: 26 U/L (ref 5–45)
BASOPHILS # BLD AUTO: 0.06 THOUSANDS/ΜL (ref 0–0.1)
BASOPHILS NFR BLD AUTO: 1 % (ref 0–1)
BILIRUB SERPL-MCNC: 0.51 MG/DL (ref 0.2–1)
BUN SERPL-MCNC: 6 MG/DL (ref 5–25)
CALCIUM SERPL-MCNC: 9.3 MG/DL (ref 8.3–10.1)
CHLORIDE SERPL-SCNC: 106 MMOL/L (ref 100–108)
CHOLEST SERPL-MCNC: 169 MG/DL
CO2 SERPL-SCNC: 27 MMOL/L (ref 21–32)
CREAT SERPL-MCNC: 0.73 MG/DL (ref 0.6–1.3)
EOSINOPHIL # BLD AUTO: 0.13 THOUSAND/ΜL (ref 0–0.61)
EOSINOPHIL NFR BLD AUTO: 2 % (ref 0–6)
ERYTHROCYTE [DISTWIDTH] IN BLOOD BY AUTOMATED COUNT: 13 % (ref 11.6–15.1)
GFR SERPL CREATININE-BSD FRML MDRD: 94 ML/MIN/1.73SQ M
GLUCOSE P FAST SERPL-MCNC: 88 MG/DL (ref 65–99)
HCT VFR BLD AUTO: 45.8 % (ref 36.5–49.3)
HCV AB SER QL: NORMAL
HDLC SERPL-MCNC: 62 MG/DL
HEMOCCULT STL QL IA: NEGATIVE
HGB BLD-MCNC: 15.3 G/DL (ref 12–17)
IMM GRANULOCYTES # BLD AUTO: 0.02 THOUSAND/UL (ref 0–0.2)
IMM GRANULOCYTES NFR BLD AUTO: 0 % (ref 0–2)
LDLC SERPL CALC-MCNC: 86 MG/DL (ref 0–100)
LYMPHOCYTES # BLD AUTO: 1.48 THOUSANDS/ΜL (ref 0.6–4.47)
LYMPHOCYTES NFR BLD AUTO: 23 % (ref 14–44)
MCH RBC QN AUTO: 34.2 PG (ref 26.8–34.3)
MCHC RBC AUTO-ENTMCNC: 33.4 G/DL (ref 31.4–37.4)
MCV RBC AUTO: 102 FL (ref 82–98)
MONOCYTES # BLD AUTO: 0.62 THOUSAND/ΜL (ref 0.17–1.22)
MONOCYTES NFR BLD AUTO: 10 % (ref 4–12)
NEUTROPHILS # BLD AUTO: 4.11 THOUSANDS/ΜL (ref 1.85–7.62)
NEUTS SEG NFR BLD AUTO: 64 % (ref 43–75)
NONHDLC SERPL-MCNC: 107 MG/DL
NRBC BLD AUTO-RTO: 0 /100 WBCS
PLATELET # BLD AUTO: 326 THOUSANDS/UL (ref 149–390)
PMV BLD AUTO: 9.7 FL (ref 8.9–12.7)
POTASSIUM SERPL-SCNC: 4 MMOL/L (ref 3.5–5.3)
PROT SERPL-MCNC: 6.8 G/DL (ref 6.4–8.2)
PSA SERPL-MCNC: 1.7 NG/ML (ref 0–4)
RBC # BLD AUTO: 4.48 MILLION/UL (ref 3.88–5.62)
SODIUM SERPL-SCNC: 142 MMOL/L (ref 136–145)
TRIGL SERPL-MCNC: 107 MG/DL
TSH SERPL DL<=0.05 MIU/L-ACNC: 2.03 UIU/ML (ref 0.36–3.74)
WBC # BLD AUTO: 6.42 THOUSAND/UL (ref 4.31–10.16)

## 2021-01-01 PROCEDURE — G0328 FECAL BLOOD SCRN IMMUNOASSAY: HCPCS

## 2021-01-01 PROCEDURE — 99214 OFFICE O/P EST MOD 30 MIN: CPT | Performed by: NURSE PRACTITIONER

## 2021-01-01 PROCEDURE — 36415 COLL VENOUS BLD VENIPUNCTURE: CPT

## 2021-01-01 PROCEDURE — G0439 PPPS, SUBSEQ VISIT: HCPCS | Performed by: NURSE PRACTITIONER

## 2021-01-01 PROCEDURE — 85025 COMPLETE CBC W/AUTO DIFF WBC: CPT

## 2021-01-01 PROCEDURE — 80053 COMPREHEN METABOLIC PANEL: CPT

## 2021-01-01 PROCEDURE — 84443 ASSAY THYROID STIM HORMONE: CPT

## 2021-01-01 PROCEDURE — G0103 PSA SCREENING: HCPCS

## 2021-01-01 PROCEDURE — 86803 HEPATITIS C AB TEST: CPT

## 2021-01-01 PROCEDURE — 80061 LIPID PANEL: CPT

## 2021-01-01 PROCEDURE — 1123F ACP DISCUSS/DSCN MKR DOCD: CPT | Performed by: NURSE PRACTITIONER

## 2021-03-02 ENCOUNTER — TELEPHONE (OUTPATIENT)
Dept: FAMILY MEDICINE CLINIC | Facility: CLINIC | Age: 71
End: 2021-03-02

## 2021-03-02 NOTE — TELEPHONE ENCOUNTER
Called patient to schedule his AWV appt and he refused, he states he is dealing with other things right now and can not commit to an appt  No further action needed    Celina Schwab LPN

## 2021-03-05 ENCOUNTER — TELEPHONE (OUTPATIENT)
Dept: FAMILY MEDICINE CLINIC | Facility: CLINIC | Age: 71
End: 2021-03-05

## 2021-03-05 NOTE — TELEPHONE ENCOUNTER
Spoke with pt about 3/2 no show and pt states he canceled it via the robot call  Pt does not wish to schedule at this time

## 2021-08-02 NOTE — LETTER
August 17, 2021     [place] [address]      Dear Mr Forest Lynn: In addition to helping you feel better when you are sick, we are interested in preventing illness and injury in the first place  In the spirit of maintaining your good health, our system indicates that you are due for the following:    Annual Wellness Visit    Please call us to make an appointment at your earliest convenience  I look forward to seeing you soon          Sincerely,         Martinez Bee MA    CC: No Recipients

## 2021-09-22 NOTE — SOCIAL WORK
CM met with the patient at bedside to review the CM role and discuss possible dc needs  At time of interview pt is AAOx3 lives in a house with his wife  JIMY in 13  Pt was IPTA  Pt has DME of a cane and is ambulatory with no assistance   Pt has bathroom on first floor with 0 steps  Pt wiht history of etoh abuse, pt declines HOST referral  Pt claudy  mental illness or inpatient psych admissions  Pt denies any history of SNF/Rehab or VNA  Pt does not have POA/LW  Preferred Pharmacy: [facility] [address]  Pt reports having no pharmacy coverage  Contact: Vicki Churchill, wife, [phone]  PCP: xiomara SHAHID reviewed d/c planning process including the following: identifying help at home, patient preference for d/c planning needs, Discharge Lounge, [facility] Meds to Bed program, availability of treatment team to discuss questions or concerns patient and/or family may have regarding understanding medications and recognizing signs and symptoms once discharged  CM also encouraged patient to follow up with all recommended appointments after discharge  Patient advised of importance for patient and family to participate in managing patients medical well being  Call to pt, message left to call back.    To get better and follow your care plan as instructed.

## 2022-01-01 ENCOUNTER — TELEPHONE (OUTPATIENT)
Dept: OTHER | Facility: OTHER | Age: 72
End: 2022-01-01

## 2022-01-01 ENCOUNTER — HOSPITAL ENCOUNTER (EMERGENCY)
Facility: HOSPITAL | Age: 72
End: 2022-04-29
Attending: EMERGENCY MEDICINE
Payer: MEDICARE

## 2022-01-01 ENCOUNTER — TELEPHONE (OUTPATIENT)
Dept: FAMILY MEDICINE CLINIC | Facility: CLINIC | Age: 72
End: 2022-01-01

## 2022-01-01 VITALS — HEART RATE: 12 BPM | OXYGEN SATURATION: 70 %

## 2022-01-01 DIAGNOSIS — I46.9 CARDIAC ARREST (HCC): Primary | ICD-10-CM

## 2022-01-01 PROCEDURE — 99285 EMERGENCY DEPT VISIT HI MDM: CPT

## 2022-01-01 PROCEDURE — 99291 CRITICAL CARE FIRST HOUR: CPT | Performed by: EMERGENCY MEDICINE

## 2022-01-01 PROCEDURE — 92950 HEART/LUNG RESUSCITATION CPR: CPT | Performed by: EMERGENCY MEDICINE

## 2022-01-01 PROCEDURE — 92950 HEART/LUNG RESUSCITATION CPR: CPT

## 2022-01-01 PROCEDURE — 31500 INSERT EMERGENCY AIRWAY: CPT

## 2022-01-01 RX ORDER — LIDOCAINE HYDROCHLORIDE 20 MG/ML
INJECTION, SOLUTION EPIDURAL; INFILTRATION; INTRACAUDAL; PERINEURAL CODE/TRAUMA/SEDATION MEDICATION
Status: COMPLETED | OUTPATIENT
Start: 2022-01-01 | End: 2022-01-01

## 2022-01-01 RX ORDER — SODIUM BICARBONATE 84 MG/ML
INJECTION, SOLUTION INTRAVENOUS CODE/TRAUMA/SEDATION MEDICATION
Status: COMPLETED | OUTPATIENT
Start: 2022-01-01 | End: 2022-01-01

## 2022-01-01 RX ORDER — EPINEPHRINE 0.1 MG/ML
SYRINGE (ML) INJECTION CODE/TRAUMA/SEDATION MEDICATION
Status: COMPLETED | OUTPATIENT
Start: 2022-01-01 | End: 2022-01-01

## 2022-01-01 RX ORDER — CALCIUM CHLORIDE 100 MG/ML
SYRINGE (ML) INTRAVENOUS CODE/TRAUMA/SEDATION MEDICATION
Status: COMPLETED | OUTPATIENT
Start: 2022-01-01 | End: 2022-01-01

## 2022-01-01 RX ADMIN — EPINEPHRINE 1 MG: 0.1 INJECTION, SOLUTION ENDOTRACHEAL; INTRACARDIAC; INTRAVENOUS at 13:22

## 2022-01-01 RX ADMIN — SODIUM CHLORIDE 1000 ML: 0.9 INJECTION, SOLUTION INTRAVENOUS at 13:08

## 2022-01-01 RX ADMIN — SODIUM BICARBONATE 50 MEQ: 84 INJECTION, SOLUTION INTRAVENOUS at 13:16

## 2022-01-01 RX ADMIN — EPINEPHRINE 1 MG: 0.1 INJECTION, SOLUTION ENDOTRACHEAL; INTRACARDIAC; INTRAVENOUS at 13:19

## 2022-01-01 RX ADMIN — EPINEPHRINE 1 MG: 0.1 INJECTION, SOLUTION ENDOTRACHEAL; INTRACARDIAC; INTRAVENOUS at 13:15

## 2022-01-01 RX ADMIN — EPINEPHRINE 1 MG: 0.1 INJECTION, SOLUTION ENDOTRACHEAL; INTRACARDIAC; INTRAVENOUS at 13:09

## 2022-01-01 RX ADMIN — CALCIUM CHLORIDE 1 G: 100 INJECTION PARENTERAL at 13:17

## 2022-01-01 RX ADMIN — SODIUM BICARBONATE 50 MEQ: 84 INJECTION INTRAVENOUS at 13:10

## 2022-01-01 RX ADMIN — LIDOCAINE HYDROCHLORIDE 100 MG: 20 INJECTION, SOLUTION EPIDURAL; INFILTRATION; INTRACAUDAL; PERINEURAL at 13:18

## 2022-01-01 RX ADMIN — EPINEPHRINE 1 MG: 0.1 INJECTION, SOLUTION ENDOTRACHEAL; INTRACARDIAC; INTRAVENOUS at 13:12

## 2022-01-01 RX ADMIN — EPINEPHRINE 1 MG: 0.1 INJECTION, SOLUTION ENDOTRACHEAL; INTRACARDIAC; INTRAVENOUS at 13:28

## 2022-01-01 RX ADMIN — EPINEPHRINE 1 MG: 0.1 INJECTION, SOLUTION ENDOTRACHEAL; INTRACARDIAC; INTRAVENOUS at 13:25

## 2022-04-29 NOTE — PROGRESS NOTES
Offered bed side prayers for the  and prayers of comfort for the patient's daughter, son-in-law and girlfriend  Family shared that the  was a peaceful man who went through many hard things  He found solace in nature        22 1400   Clinical Encounter Type   Visited With Family   Crisis Visit Death;ED   Referral From Nurse   Referral To [facility] Mell Duran Text;Prayer
[id number]

## 2022-04-29 NOTE — CODE DOCUMENTATION
Brit Humphrey states "you can send the patient to the morgue, I am waiting to speak with Primary doctor to sign the death certificate"

## 2022-04-29 NOTE — CODE DOCUMENTATION
ME called again - LM for him to call in to clear patient to be moved to [facility]

## 2022-04-29 NOTE — ED PROVIDER NOTES
History  Chief Complaint   Patient presents with    Cardiac Arrest     witnessed arrest while mowing lawn by neighbor, vomited and fell to the ground  police stated  CPR was started within 3 minutes     Patient ER as cardiac arrest in progress  Per EMS, pt was mowing the lawn, vomited and fell to the ground  CPR was initiated shortly afterwards  Patient was found to be in PEA, intubated and 3 rounds of CPR administered EN route, arrived in PEA  IO inserted en route  Per medical records, patient has a history of depression, alcohol dependence and nicotine dependence  History provided by:  Patient   used: No    Cardiac Arrest  Witnessed by:  Bystander  Incident location:  Home  Time before BLS initiated:  3-5 minutes  Condition upon EMS arrival:  Apneic and unresponsive  Pulse:  Absent  Initial cardiac rhythm per EMS:  PEA  Treatments prior to arrival:  ACLS protocol  Medications given prior to ED:  Epinephrine  Airway:  Intubation prior to arrival  Rhythm on admission to ED:  Unchanged      Prior to Admission Medications   Prescriptions Last Dose Informant Patient Reported? Taking?   nystatin (MYCOSTATIN) 500,000 units/5 mL suspension   No No   Sig: Apply 5 mL (500,000 Units total) to the mouth or throat 4 (four) times a day      Facility-Administered Medications: None       Past Medical History:   Diagnosis Date    Alcohol dependency ([facility] [address] )     Depression     Nicotine dependence        Past Surgical History:   Procedure Laterality Date    HAND SURGERY      Replantation of the hand/fingers     TONSILLECTOMY         Family History   Problem Relation Age of Onset    Breast cancer Sister     Breast cancer Family     No Known Problems Mother     No Known Problems Father      I have reviewed and agree with the history as documented      E-Cigarette/Vaping     E-Cigarette/Vaping Substances     Social History     Tobacco Use    Smoking status: Current Every Day Smoker     Packs/day: 1 00  Smokeless tobacco: Never Used   Substance Use Topics    Alcohol use: Yes     Comment: daily    Drug use: No       Review of Systems   Unable to perform ROS: Unstable vital signs       Physical Exam  Physical Exam  Vitals and nursing note reviewed  Constitutional:       General: He is in acute distress  Appearance: He is toxic-appearing  Interventions: He is intubated  HENT:      Head: Normocephalic and atraumatic  Eyes:      General: Lids are normal       Comments: Pupils fixed and dilated   Cardiovascular:      Comments: CPR in progress, no palpable pulses  Pulmonary:      Effort: He is intubated  Comments: Artificial breath sounds, patient is intubated  Abdominal:      General: Abdomen is flat  Musculoskeletal:         General: No tenderness or signs of injury  Skin:     Capillary Refill: Capillary refill takes more than 3 seconds  Coloration: Skin is mottled  Neurological:      Mental Status: He is unresponsive  GCS: GCS eye subscore is 1  GCS verbal subscore is 1  GCS motor subscore is 1           Vital Signs  ED Triage Vitals [04/29/22 1308]   Temp Pulse Resp BP SpO2   -- -- -- -- --      Temp src Heart Rate Source Patient Position - Orthostatic VS BP Location FiO2 (%)   -- Monitor Lying -- --      Pain Score       --           Vitals:    04/29/22 1308   Patient Position - Orthostatic VS: Lying         Visual Acuity      ED Medications  Medications   sodium chloride 0 9 % bolus (1,000 mL Intraosseous New Bag 4/29/22 1308)   EPINEPHrine (ADRENALIN) injection (1 mg Intravenous Given 4/29/22 1328)   sodium bicarbonate 8 4 % injection (50 mEq Intravenous Given 4/29/22 1310)   sodium bicarbonate 50 mEq/50 mL injection (50 mEq Intravenous Given 4/29/22 1316)   calcium chloride 1 g/10 mL injection (1 g Intravenous Given 4/29/22 1317)   lidocaine (PF) (XYLOCAINE-MPF) 100 mg/5 mL injection (100 mg Intraosseous Given 4/29/22 1318)       Diagnostic Studies  Results Reviewed None                 No orders to display              Procedures  CriticalCare Time  Performed by: Venus Nina DO  Authorized by: Venus Nina DO     Critical care provider statement:     Critical care time (minutes):  35    Critical care time was exclusive of:  Separately billable procedures and treating other patients and teaching time    Critical care was necessary to treat or prevent imminent or life-threatening deterioration of the following conditions:  Cardiac failure    Critical care was time spent personally by me on the following activities:  Blood draw for specimens, obtaining history from patient or surrogate, development of treatment plan with patient or surrogate, evaluation of patient's response to treatment, examination of patient, interpretation of cardiac output measurements, ordering and performing treatments and interventions and re-evaluation of patient's condition    I assumed direction of critical care for this patient from another provider in my specialty: no               ED Course                                             MDM  Number of Diagnoses or Management Options  Cardiac arrest [facility]): new and requires workup  Diagnosis management comments: Patient's daughter at the bedside at time of pronouncement  I spoke to patient's girlfriend, who admitted that he has had chest pain since       Risk of Complications, Morbidity, and/or Mortality  Presenting problems: high  Diagnostic procedures: high  Management options: high    Patient Progress  Patient progress: ()      Disposition  Final diagnoses:   Cardiac arrest [facility])     Time reflects when diagnosis was documented in both MDM as applicable and the Disposition within this note     Time User Action Codes Description Comment    2022  1:43 PM Michael Fitzpatrick [I46 9] Cardiac arrest [facility])       ED Disposition     ED Disposition Condition Date/Time Comment        1:43 PM Follow-up Information    None       Date, Time and Cause of Death    Preliminary Cause of Death: Cardiac arrest [facility])       Patient's Medications   Discharge Prescriptions    No medications on file       No discharge procedures on file      PDMP Review     None          ED Provider  Electronically Signed by           Sally Rodriguez DO  04/29/22 3200 Yeny Aldridge Se, DO  04/29/22 3200 Yeny Aldridge Se, DO  04/29/22 1805

## 2022-04-30 NOTE — ED NOTES
[employer] "Helen Powers" states "[facility] the family has signed consent and we will arrange with your OR staff"    Pt   [facility] in [facility] with Security present     [facility]  04/30/22 7064